# Patient Record
Sex: MALE | Race: WHITE | NOT HISPANIC OR LATINO
[De-identification: names, ages, dates, MRNs, and addresses within clinical notes are randomized per-mention and may not be internally consistent; named-entity substitution may affect disease eponyms.]

---

## 2017-05-19 ENCOUNTER — TRANSCRIPTION ENCOUNTER (OUTPATIENT)
Age: 72
End: 2017-05-19

## 2017-05-21 ENCOUNTER — TRANSCRIPTION ENCOUNTER (OUTPATIENT)
Age: 72
End: 2017-05-21

## 2017-05-26 ENCOUNTER — TRANSCRIPTION ENCOUNTER (OUTPATIENT)
Age: 72
End: 2017-05-26

## 2017-10-28 ENCOUNTER — TRANSCRIPTION ENCOUNTER (OUTPATIENT)
Age: 72
End: 2017-10-28

## 2017-11-08 ENCOUNTER — TRANSCRIPTION ENCOUNTER (OUTPATIENT)
Age: 72
End: 2017-11-08

## 2018-02-03 ENCOUNTER — TRANSCRIPTION ENCOUNTER (OUTPATIENT)
Age: 73
End: 2018-02-03

## 2018-02-06 ENCOUNTER — TRANSCRIPTION ENCOUNTER (OUTPATIENT)
Age: 73
End: 2018-02-06

## 2018-04-04 ENCOUNTER — APPOINTMENT (OUTPATIENT)
Dept: CARDIOLOGY | Facility: CLINIC | Age: 73
End: 2018-04-04
Payer: MEDICARE

## 2018-04-04 ENCOUNTER — NON-APPOINTMENT (OUTPATIENT)
Age: 73
End: 2018-04-04

## 2018-04-04 VITALS
SYSTOLIC BLOOD PRESSURE: 176 MMHG | HEIGHT: 71 IN | DIASTOLIC BLOOD PRESSURE: 97 MMHG | WEIGHT: 304 LBS | HEART RATE: 94 BPM | BODY MASS INDEX: 42.56 KG/M2 | OXYGEN SATURATION: 94 %

## 2018-04-04 DIAGNOSIS — Z87.891 PERSONAL HISTORY OF NICOTINE DEPENDENCE: ICD-10-CM

## 2018-04-04 DIAGNOSIS — R09.89 OTHER SPECIFIED SYMPTOMS AND SIGNS INVOLVING THE CIRCULATORY AND RESPIRATORY SYSTEMS: ICD-10-CM

## 2018-04-04 DIAGNOSIS — E03.9 HYPOTHYROIDISM, UNSPECIFIED: ICD-10-CM

## 2018-04-04 PROCEDURE — 93000 ELECTROCARDIOGRAM COMPLETE: CPT

## 2018-04-04 PROCEDURE — 99205 OFFICE O/P NEW HI 60 MIN: CPT

## 2018-06-21 ENCOUNTER — APPOINTMENT (OUTPATIENT)
Dept: CARDIOLOGY | Facility: CLINIC | Age: 73
End: 2018-06-21
Payer: MEDICARE

## 2018-06-21 PROCEDURE — 93880 EXTRACRANIAL BILAT STUDY: CPT

## 2018-07-25 ENCOUNTER — APPOINTMENT (OUTPATIENT)
Dept: CARDIOLOGY | Facility: CLINIC | Age: 73
End: 2018-07-25
Payer: MEDICARE

## 2018-07-25 PROCEDURE — 78452 HT MUSCLE IMAGE SPECT MULT: CPT

## 2018-07-25 PROCEDURE — 93015 CV STRESS TEST SUPVJ I&R: CPT

## 2018-07-25 PROCEDURE — A9500: CPT

## 2018-07-26 ENCOUNTER — APPOINTMENT (OUTPATIENT)
Dept: CARDIOLOGY | Facility: CLINIC | Age: 73
End: 2018-07-26
Payer: MEDICARE

## 2018-07-26 DIAGNOSIS — R94.39 ABNORMAL RESULT OF OTHER CARDIOVASCULAR FUNCTION STUDY: ICD-10-CM

## 2018-07-26 PROCEDURE — 93306 TTE W/DOPPLER COMPLETE: CPT

## 2018-08-08 PROBLEM — R94.39 ABNORMAL STRESS TEST: Status: ACTIVE | Noted: 2018-08-08

## 2018-08-27 ENCOUNTER — APPOINTMENT (OUTPATIENT)
Dept: CARDIOLOGY | Facility: CLINIC | Age: 73
End: 2018-08-27
Payer: MEDICARE

## 2018-08-27 VITALS
SYSTOLIC BLOOD PRESSURE: 159 MMHG | HEART RATE: 88 BPM | OXYGEN SATURATION: 85 % | DIASTOLIC BLOOD PRESSURE: 80 MMHG | BODY MASS INDEX: 44.1 KG/M2 | HEIGHT: 71 IN | WEIGHT: 315 LBS

## 2018-08-27 PROCEDURE — 99215 OFFICE O/P EST HI 40 MIN: CPT

## 2018-10-04 ENCOUNTER — APPOINTMENT (OUTPATIENT)
Dept: CARDIOLOGY | Facility: CLINIC | Age: 73
End: 2018-10-04
Payer: MEDICARE

## 2018-10-04 VITALS
HEART RATE: 74 BPM | WEIGHT: 315 LBS | HEIGHT: 71 IN | OXYGEN SATURATION: 88 % | SYSTOLIC BLOOD PRESSURE: 138 MMHG | DIASTOLIC BLOOD PRESSURE: 70 MMHG | BODY MASS INDEX: 44.1 KG/M2

## 2018-10-04 PROCEDURE — 99214 OFFICE O/P EST MOD 30 MIN: CPT

## 2018-11-07 ENCOUNTER — APPOINTMENT (OUTPATIENT)
Dept: CARDIOLOGY | Facility: CLINIC | Age: 73
End: 2018-11-07
Payer: MEDICARE

## 2018-11-07 VITALS
SYSTOLIC BLOOD PRESSURE: 101 MMHG | OXYGEN SATURATION: 90 % | WEIGHT: 300 LBS | DIASTOLIC BLOOD PRESSURE: 63 MMHG | BODY MASS INDEX: 42 KG/M2 | HEIGHT: 71 IN | HEART RATE: 75 BPM

## 2018-11-07 DIAGNOSIS — I87.2 VENOUS INSUFFICIENCY (CHRONIC) (PERIPHERAL): ICD-10-CM

## 2018-11-07 PROCEDURE — 99214 OFFICE O/P EST MOD 30 MIN: CPT

## 2018-11-07 NOTE — REASON FOR VISIT
[Follow-Up - Clinic] : a clinic follow-up of [Dyspnea] : dyspnea [Hyperlipidemia] : hyperlipidemia [Hypertension] : hypertension [FreeTextEntry1] : VPCs, Diabetes, CAD, Pul Htn

## 2018-11-07 NOTE — HISTORY OF PRESENT ILLNESS
[FreeTextEntry1] : I saw Kennedy Rick in the office today for cardiac follow up. He is a 72-year-old white male who has insulin-dependent diabetes, hypertension, hyperlipidemia. He was smoking 2 packs of cigarettes per day but stopped about 3 years ago. He is overweight. He has COPD. On routine ECG he was noted to have ventricular trigeminy. He has no symptoms.\par \par His parents  in a plane crash at an early age. Otherwise there is no family history of heart disease. He has not had any cardiac workup. He is limited in exercise by dyspnea on exertion. He does see a pulmonologist and endocrinologist.\par \par He has chronic venous insufficiency with support stockings. Has seen a vascular surgeon and has no significant arterial disease.\par \par As part of a cardiac workup for shortness of breath he underwent echocardiogram that showed ejection fraction 54%. Chemical nuclear stress she showed infra-apical defect suggesting possible myocardial infarction. He underwent cardiac catheterization . This showed nonobstructive coronary disease with a left ventricular end-diastolic pressure 18. Had significant pulmonary hypertension with a pressure 65/29. He also has obstructive sleep apnea.\par \par The patient has been diagnosed with sleep apnea and will be starting with a CPAP mask. \par \par The patient was recently admitted to [facility] for 5 days to treat his volume overload. He lost about 17 pounds of water. He is feeling better. The patient will get me a copy of his present medication list. His blood pressures 100/70 without orthostatic change.

## 2018-11-07 NOTE — REVIEW OF SYSTEMS
[Eyeglasses] : currently wearing eyeglasses [Dyspnea on exertion] : dyspnea during exertion [Lower Ext Edema] : lower extremity edema [Joint Pain] : joint pain [Shoulder Pain] : shoulder pain [Knee Pain] : knee pain [Negative] : Genitourinary [Fever] : no fever [Headache] : no headache [Chills] : no chills [Feeling Fatigued] : not feeling fatigued [Blurry Vision] : no blurred vision [Seeing Double (Diplopia)] : no diplopia [Skin: A Rash] : no rash: [Dizziness] : no dizziness [Depression] : no depression [Anxiety] : no anxiety [Excessive Thirst] : no polydipsia [Easy Bleeding] : no tendency for easy bleeding [Easy Bruising] : no tendency for easy bruising

## 2018-11-07 NOTE — DISCUSSION/SUMMARY
[FreeTextEntry1] : The patient is doing well. He will monitor his weight every day and if he starts to gain weight will let us know.\par \par I would like to review the records from [facility]. He is interested in starting in the exercise program at the [facility]. He will check to see if they have a pulmonary program.\par \par He will stay on his present medication. If all is well I would see him again in approximately 2 months.

## 2018-11-07 NOTE — PHYSICAL EXAM
[General Appearance - Well Developed] : well developed [Normal Appearance] : normal appearance [Well Groomed] : well groomed [General Appearance - Well Nourished] : well nourished [No Deformities] : no deformities [General Appearance - In No Acute Distress] : no acute distress [Normal Conjunctiva] : the conjunctiva exhibited no abnormalities [Normal Oral Mucosa] : normal oral mucosa [Normal Jugular Venous A Waves Present] : normal jugular venous A waves present [Normal Jugular Venous V Waves Present] : normal jugular venous V waves present [No Jugular Venous Arndt A Waves] : no jugular venous arndt A waves [Respiration, Rhythm And Depth] : normal respiratory rhythm and effort [Exaggerated Use Of Accessory Muscles For Inspiration] : no accessory muscle use [Auscultation Breath Sounds / Voice Sounds] : lungs were clear to auscultation bilaterally [Bowel Sounds] : normal bowel sounds [Abdomen Soft] : soft [Abdomen Tenderness] : non-tender [Abnormal Walk] : normal gait [Gait - Sufficient For Exercise Testing] : the gait was sufficient for exercise testing [Nail Clubbing] : no clubbing of the fingernails [Cyanosis, Localized] : no localized cyanosis [Skin Color & Pigmentation] : normal skin color and pigmentation [Skin Turgor] : normal skin turgor [] : no rash [Oriented To Time, Place, And Person] : oriented to person, place, and time [Impaired Insight] : insight and judgment were intact [No Anxiety] : not feeling anxious [Not Palpable] : not palpable [Normal Rate] : normal [Normal S1] : normal S1 [Normal S2] : normal S2 [Distant] : the heart sounds were distant [No Murmur] : no murmurs heard [2+] : left 2+ [1+] : left 1+ [No Abnormalities] : the abdominal aorta was not enlarged and no bruit was heard [___ +] : bilateral [unfilled]U+ pitting edema to the ankles [S3] : no S3 [S4] : no S4 [Right Carotid Bruit] : no bruit heard over the right carotid [Left Carotid Bruit] : no bruit heard over the left carotid [Right Femoral Bruit] : no bruit heard over the right femoral artery [Left Femoral Bruit] : no bruit heard over the left femoral artery

## 2019-01-14 ENCOUNTER — APPOINTMENT (OUTPATIENT)
Dept: CARDIOLOGY | Facility: CLINIC | Age: 74
End: 2019-01-14
Payer: MEDICARE

## 2019-01-14 VITALS
DIASTOLIC BLOOD PRESSURE: 60 MMHG | OXYGEN SATURATION: 93 % | HEART RATE: 60 BPM | BODY MASS INDEX: 41.86 KG/M2 | SYSTOLIC BLOOD PRESSURE: 104 MMHG | WEIGHT: 299 LBS | HEIGHT: 71 IN

## 2019-01-14 PROCEDURE — 99214 OFFICE O/P EST MOD 30 MIN: CPT

## 2019-01-14 NOTE — HISTORY OF PRESENT ILLNESS
[FreeTextEntry1] : I saw Kennedy Rick in the office today for cardiac follow up. He is a 73-year-old white male who has insulin-dependent diabetes, hypertension, hyperlipidemia. He was smoking 2 packs of cigarettes per day but stopped about 3 years ago. He is overweight. He has COPD. On routine ECG he was noted to have ventricular trigeminy. He has no symptoms.\par \par His parents  in a plane crash at an early age. Otherwise there is no family history of heart disease. He has not had any cardiac workup. He is limited in exercise by dyspnea on exertion. He does see a pulmonologist and endocrinologist.\par \par He has chronic venous insufficiency with support stockings. Has seen a vascular surgeon and has no significant arterial disease.\par \par As part of a cardiac workup for shortness of breath he underwent echocardiogram that showed ejection fraction 54%. Chemical nuclear stress she showed infra-apical defect suggesting possible myocardial infarction. He underwent cardiac catheterization . This showed nonobstructive coronary disease with a left ventricular end-diastolic pressure 18. Had significant pulmonary hypertension with a pressure 65/29. He also has obstructive sleep apnea.\par \par The patient has been diagnosed with sleep apnea and will be starting with a CPAP mask. \par \par The patient was recently admitted to [facility]  for 5 days to treat his volume overload. He lost about 17 pounds of water. He is feeling better.. His blood pressures 100/70 without orthostatic change.

## 2019-01-14 NOTE — DISCUSSION/SUMMARY
[FreeTextEntry1] : The patient is doing well.  Over the holidays he did not gaain any body weight and with his medications as kept off water weight. He is now going to concentrate on losing weight. He is able to walk and do some exercise and will be starting in the pulmonary exercise program at [place].\par \par He'll stay on his present medications. Today he is in a bigeminal pattern. He'll wear a 24-hour Holter monitor to quantitate the ventricular arrhythmia.\par \par If all is well I will see him in 3 months. If you do any blood work I would appreciates  a copy to my office. If he does have trouble he will call me.

## 2019-01-16 ENCOUNTER — APPOINTMENT (OUTPATIENT)
Dept: CARDIOLOGY | Facility: CLINIC | Age: 74
End: 2019-01-16
Payer: MEDICARE

## 2019-01-16 PROCEDURE — 93224 XTRNL ECG REC UP TO 48 HRS: CPT

## 2019-02-13 ENCOUNTER — FORM ENCOUNTER (OUTPATIENT)
Age: 74
End: 2019-02-13

## 2019-02-14 ENCOUNTER — APPOINTMENT (OUTPATIENT)
Dept: MRI IMAGING | Facility: CLINIC | Age: 74
End: 2019-02-14
Payer: MEDICARE

## 2019-02-14 ENCOUNTER — OUTPATIENT (OUTPATIENT)
Dept: OUTPATIENT SERVICES | Facility: HOSPITAL | Age: 74
LOS: 1 days | End: 2019-02-14
Payer: MEDICARE

## 2019-02-14 DIAGNOSIS — I49.3 VENTRICULAR PREMATURE DEPOLARIZATION: ICD-10-CM

## 2019-02-14 DIAGNOSIS — I25.10 ATHEROSCLEROTIC HEART DISEASE OF NATIVE CORONARY ARTERY WITHOUT ANGINA PECTORIS: ICD-10-CM

## 2019-02-14 DIAGNOSIS — I27.20 PULMONARY HYPERTENSION, UNSPECIFIED: ICD-10-CM

## 2019-02-14 PROCEDURE — 75561 CARDIAC MRI FOR MORPH W/DYE: CPT

## 2019-02-14 PROCEDURE — A9585: CPT

## 2019-02-14 PROCEDURE — 75561 CARDIAC MRI FOR MORPH W/DYE: CPT | Mod: 26

## 2019-03-28 ENCOUNTER — APPOINTMENT (OUTPATIENT)
Dept: CARDIOLOGY | Facility: CLINIC | Age: 74
End: 2019-03-28
Payer: MEDICARE

## 2019-03-28 VITALS
DIASTOLIC BLOOD PRESSURE: 81 MMHG | OXYGEN SATURATION: 90 % | SYSTOLIC BLOOD PRESSURE: 158 MMHG | HEART RATE: 84 BPM | HEIGHT: 71 IN | WEIGHT: 303 LBS | BODY MASS INDEX: 42.42 KG/M2

## 2019-03-28 PROCEDURE — 99215 OFFICE O/P EST HI 40 MIN: CPT

## 2019-03-28 NOTE — DISCUSSION/SUMMARY
[FreeTextEntry1] : The patient's cardiac status is stable. He has nonobstructive coronary disease with satisfactory left ventricular systolic function. There is evidence of possible subendocardial infarction both by stress testing and by MR scan. He has very frequent VPCs on a Holter monitor, numbering 7.5% of all heartbeats. He has no chest pain, palpitation or lightheadedness. His breathing is stable in the exercise program at [place], and his volume status is also stable on the Lasix and support stockings. He is not being treated for sleep apnea at this time but according to the patient he feel that this is a mild condition.\par \par He will stay on his present medication and try to lose weight. If he has any symptoms or problems he will call me. I would appreciate your sending me a copy of his most recent blood for my review and he will get a fasting lipid profile to see if we need to adjust the Crestor. If all is well, I will see him in 3 months.\par \par He did go over the importance of lifestyle including exercise, low calorie low fat low salt diet, and weight control. If he does have any symptoms he would call me. I answered all of his questions.\par \par

## 2019-03-28 NOTE — HISTORY OF PRESENT ILLNESS
[FreeTextEntry1] : I saw Kennedy Rick in the office today for cardiac follow up. He is a 73-year-old white male who has insulin-dependent diabetes, hypertension, hyperlipidemia. He was smoking 2 packs of cigarettes per day but stopped about 3+  years ago. He is overweight. He has COPD. On routine ECG he was noted to have ventricular trigeminy. He has no symptoms.\par \par His parents  in a plane crash at an early age. Otherwise there is no family history of heart disease. He has not had any cardiac workup. He is limited in exercise by dyspnea on exertion. He does see a pulmonologist and endocrinologist.\par \par He has chronic venous insufficiency with support stockings. Has seen a vascular surgeon and has no significant arterial disease.\par \par As part of a cardiac workup for shortness of breath he underwent echocardiogram that showed ejection fraction 54%. Chemical nuclear stress she showed infra-apical defect suggesting possible myocardial infarction. He underwent cardiac catheterization . This showed nonobstructive coronary disease with a left ventricular end-diastolic pressure 18. Had significant pulmonary hypertension with a pressure 65/29. He also has obstructive sleep apnea.He had been using a CPAP mask but apparently the company felt that he wasn't using it enough and it was taken away. \par \par The patient was  admitted to [facility]  for 5 days to treat his volume overload. He lost about 17 pounds of water. He is feeling better.. He now goes 2 times a week to the pulmonary rehabilitation program at [facility] has been doing well. He has been maintaining his weight loss but has not been able to lose additional weight. Status remained stable and his breathing has been good. O2 sat remains approximately 90%.\par \par His having no palpitations, chest pain, or lightheadedness.\par \par He had an MR scan of his heart that showed ejection fraction of 58%. There was a subtle foci of gadolinium enhancement along the inferolateral wall and anteroseptal wall that could be secondary to myocardial infarction. His prior chemical stress test showed possible infarction in the infra-apical area. Ejection fraction was 46%. I had discussed his case with the doctor physiologist Freida La he felt that carvedilol was adequate for treatment..

## 2019-06-25 ENCOUNTER — APPOINTMENT (OUTPATIENT)
Dept: CARDIOLOGY | Facility: CLINIC | Age: 74
End: 2019-06-25
Payer: MEDICARE

## 2019-06-25 VITALS
WEIGHT: 300 LBS | SYSTOLIC BLOOD PRESSURE: 147 MMHG | HEART RATE: 74 BPM | DIASTOLIC BLOOD PRESSURE: 76 MMHG | OXYGEN SATURATION: 88 % | HEIGHT: 71 IN | BODY MASS INDEX: 42 KG/M2

## 2019-06-25 PROCEDURE — 99214 OFFICE O/P EST MOD 30 MIN: CPT

## 2019-06-25 NOTE — REASON FOR VISIT
[Follow-Up - Clinic] : a clinic follow-up of [Hypertension] : hypertension [Hyperlipidemia] : hyperlipidemia [Dyspnea] : dyspnea [FreeTextEntry1] : VPCs, Diabetes, CAD, Pul Htn

## 2019-06-25 NOTE — REVIEW OF SYSTEMS
[Eyeglasses] : currently wearing eyeglasses [Dyspnea on exertion] : dyspnea during exertion [Joint Pain] : joint pain [Lower Ext Edema] : lower extremity edema [Shoulder Pain] : shoulder pain [Knee Pain] : knee pain [Negative] : Genitourinary [Headache] : no headache [Fever] : no fever [Feeling Fatigued] : not feeling fatigued [Blurry Vision] : no blurred vision [Chills] : no chills [Dizziness] : no dizziness [Skin: A Rash] : no rash: [Seeing Double (Diplopia)] : no diplopia [Depression] : no depression [Anxiety] : no anxiety [Excessive Thirst] : no polydipsia [Easy Bruising] : no tendency for easy bruising [Easy Bleeding] : no tendency for easy bleeding

## 2019-06-25 NOTE — HISTORY OF PRESENT ILLNESS
[FreeTextEntry1] : I saw Kennedy Rick in the office today for cardiac follow up. He is a 73-year-old white male who has insulin-dependent diabetes, hypertension, hyperlipidemia. He was smoking 2 packs of cigarettes per day but stopped about 3+  years ago. He is overweight. He has COPD. On routine ECG he was noted to have ventricular trigeminy. He has no symptoms.\par \par His parents  in a plane crash at an early age. Otherwise there is no family history of heart disease. He has not had any cardiac workup. He is limited in exercise by dyspnea on exertion. He does see a pulmonologist and endocrinologist.\par \par He has chronic venous insufficiency with support stockings. Has seen a vascular surgeon and has no significant arterial disease.\par \par As part of a cardiac workup for shortness of breath he underwent echocardiogram that showed ejection fraction 54%. Chemical nuclear stress she showed infra-apical defect suggesting possible myocardial infarction. He underwent cardiac catheterization . This showed nonobstructive coronary disease with a left ventricular end-diastolic pressure 18. Had significant pulmonary hypertension with a pressure 65/29. He also has obstructive sleep apnea.He had been using a CPAP mask but apparently the company felt that he wasn't using it enough and it was taken away. \par \par The patient was  admitted to [facility]  for 5 days to treat his volume overload. He lost about 17 pounds of water. He is feeling better.. He now goes 2 times a week to the pulmonary rehabilitation program at [facility] has been doing well. He has been maintaining his weight loss but has not been able to lose additional weight. Status remained stable and his breathing has been good. O2 sat remains approximately 90%.He is still not being treated for sleep apnea\par \par His having no palpitations, chest pain, or lightheadedness.\par \par He had an MR scan of his heart that showed ejection fraction of 58%. There was a subtle foci of gadolinium enhancement along the inferolateral wall and anteroseptal wall that could be secondary to myocardial infarction. His prior chemical stress test showed possible infarction in the infra-apical area. Ejection fraction was 46%. I had discussed his case with Dr. Kahn, the electrophysiologist who  felt that carvedilol was adequate for treatment..

## 2019-06-25 NOTE — DISCUSSION/SUMMARY
[FreeTextEntry1] : The patient's cardiac status remained stable. On his medications clinically having no significant arrhythmia. He is not in any left-sided congestive heart failure. He remains with stable pulmonary hypertension with chronic this insufficiency. On his medication his blood pressure heart rate are well controlled.\par \par  I would appreciate receiving a copy of his most recent general blood work. He will stay on his present medication. Because of any symptoms or problems he will call me. He still trying to lose total body weight and has been unsuccessful.\par \par If all is well I will see him in 4 months.

## 2019-06-25 NOTE — PHYSICAL EXAM
[General Appearance - Well Developed] : well developed [Normal Appearance] : normal appearance [Well Groomed] : well groomed [No Deformities] : no deformities [General Appearance - Well Nourished] : well nourished [General Appearance - In No Acute Distress] : no acute distress [Normal Conjunctiva] : the conjunctiva exhibited no abnormalities [Normal Oral Mucosa] : normal oral mucosa [Normal Jugular Venous A Waves Present] : normal jugular venous A waves present [Normal Jugular Venous V Waves Present] : normal jugular venous V waves present [No Jugular Venous Arndt A Waves] : no jugular venous arndt A waves [Exaggerated Use Of Accessory Muscles For Inspiration] : no accessory muscle use [Respiration, Rhythm And Depth] : normal respiratory rhythm and effort [Bowel Sounds] : normal bowel sounds [Auscultation Breath Sounds / Voice Sounds] : lungs were clear to auscultation bilaterally [Abnormal Walk] : normal gait [Abdomen Tenderness] : non-tender [Abdomen Soft] : soft [Gait - Sufficient For Exercise Testing] : the gait was sufficient for exercise testing [Cyanosis, Localized] : no localized cyanosis [Nail Clubbing] : no clubbing of the fingernails [Skin Color & Pigmentation] : normal skin color and pigmentation [Skin Turgor] : normal skin turgor [] : no rash [Oriented To Time, Place, And Person] : oriented to person, place, and time [No Anxiety] : not feeling anxious [Impaired Insight] : insight and judgment were intact [Normal S1] : normal S1 [Normal Rate] : normal [Not Palpable] : not palpable [Normal S2] : normal S2 [Distant] : the heart sounds were distant [No Murmur] : no murmurs heard [2+] : left 2+ [1+] : right 1+ [No Abnormalities] : the abdominal aorta was not enlarged and no bruit was heard [___ +] : bilateral [unfilled]U+ pitting edema to the ankles [S4] : no S4 [Right Carotid Bruit] : no bruit heard over the right carotid [S3] : no S3 [Left Carotid Bruit] : no bruit heard over the left carotid [Right Femoral Bruit] : no bruit heard over the right femoral artery [Left Femoral Bruit] : no bruit heard over the left femoral artery

## 2019-10-20 ENCOUNTER — TRANSCRIPTION ENCOUNTER (OUTPATIENT)
Age: 74
End: 2019-10-20

## 2019-10-29 ENCOUNTER — APPOINTMENT (OUTPATIENT)
Dept: CARDIOLOGY | Facility: CLINIC | Age: 74
End: 2019-10-29
Payer: MEDICARE

## 2019-10-29 ENCOUNTER — NON-APPOINTMENT (OUTPATIENT)
Age: 74
End: 2019-10-29

## 2019-10-29 VITALS
OXYGEN SATURATION: 87 % | BODY MASS INDEX: 42.84 KG/M2 | SYSTOLIC BLOOD PRESSURE: 111 MMHG | HEART RATE: 68 BPM | HEIGHT: 71 IN | WEIGHT: 306 LBS | DIASTOLIC BLOOD PRESSURE: 70 MMHG

## 2019-10-29 DIAGNOSIS — I49.3 VENTRICULAR PREMATURE DEPOLARIZATION: ICD-10-CM

## 2019-10-29 PROCEDURE — 99214 OFFICE O/P EST MOD 30 MIN: CPT

## 2019-10-29 PROCEDURE — 93000 ELECTROCARDIOGRAM COMPLETE: CPT

## 2019-10-29 NOTE — DISCUSSION/SUMMARY
[FreeTextEntry1] : The patient is stable. He is having no shortness of breath, chest pain, palpitations. Unfortunately failed to lose any weight. According to the patient his kidney function has been stable. Volume status is unchanged.\par \par The patient started his present medication. If you do any blood work are appreciated a copy for my records.\par \par If he has any problems or symptoms he would call me. He'll go for follow up echocardiogram. If all is well I will see him in 6 months.

## 2019-10-29 NOTE — HISTORY OF PRESENT ILLNESS
[FreeTextEntry1] : I saw Kennedy Rikc in the office today for cardiac follow up. He is a 74-year-old white male who has insulin-dependent diabetes, hypertension, hyperlipidemia. He was smoking 2 packs of cigarettes per day but stopped about 3+  years ago. He is overweight. He has COPD. On routine ECG he was noted to have ventricular trigeminy. He had no symptoms.\par \par His parents  in a plane crash at an early age. Otherwise there is no family history of heart disease. He has not had any cardiac workup. He is limited in exercise by dyspnea on exertion. He does see a pulmonologist and endocrinologist.\par \par He has chronic venous insufficiency with support stockings. Has seen a vascular surgeon and has no significant arterial disease.\par \par As part of a cardiac workup for shortness of breath he underwent echocardiogram that showed ejection fraction 54%. Chemical nuclear stress she showed infra-apical defect suggesting possible myocardial infarction. He underwent cardiac catheterization . This showed nonobstructive coronary disease with a left ventricular end-diastolic pressure 18. Had significant pulmonary hypertension with a pressure 65/29. He also has obstructive sleep apnea.He had been using a CPAP mask but apparently the company felt that he wasn't using it enough and it was taken away. \par \par The patient was  admitted to [facility]  for 5 days to treat his volume overload. He lost about 17 pounds of water. He is feeling better.. He now goes 2 times a week to the pulmonary rehabilitation program at [facility] has been doing well. He has been maintaining his weight loss but has not been able to lose additional weight. Status remained stable and his breathing has been good. O2 sat remains approximately 90%.He is still not being treated for sleep apnea\par \par His having no palpitations, chest pain, or lightheadedness.\par \par He had an MR scan of his heart that showed ejection fraction of 58%. There was a subtle foci of gadolinium enhancement along the inferolateral wall and anteroseptal wall that could be secondary to myocardial infarction. His prior chemical stress test showed possible infarction in the infra-apical area. Ejection fraction was 46%. I had discussed his case with Dr. Kahn, the electrophysiologist who  felt that carvedilol was adequate for treatment..\par \par His recent Holter monitor performed  demonstrated [id number] VPCs with 39 couplets and 99 couplets. Echo 10/18 demonstrated normal left ventricular systolic function.

## 2019-11-23 ENCOUNTER — EMERGENCY (EMERGENCY)
Facility: HOSPITAL | Age: 74
LOS: 1 days | Discharge: HOME | End: 2019-11-23
Attending: EMERGENCY MEDICINE | Admitting: EMERGENCY MEDICINE
Payer: MEDICARE

## 2019-11-23 VITALS
OXYGEN SATURATION: 98 % | TEMPERATURE: 98 F | DIASTOLIC BLOOD PRESSURE: 72 MMHG | HEART RATE: 78 BPM | RESPIRATION RATE: 16 BRPM | SYSTOLIC BLOOD PRESSURE: 115 MMHG

## 2019-11-23 VITALS
OXYGEN SATURATION: 91 % | HEART RATE: 71 BPM | RESPIRATION RATE: 15 BRPM | WEIGHT: 291.89 LBS | SYSTOLIC BLOOD PRESSURE: 120 MMHG | DIASTOLIC BLOOD PRESSURE: 75 MMHG | HEIGHT: 71 IN | TEMPERATURE: 98 F

## 2019-11-23 PROCEDURE — 73562 X-RAY EXAM OF KNEE 3: CPT

## 2019-11-23 PROCEDURE — 99284 EMERGENCY DEPT VISIT MOD MDM: CPT | Mod: 25

## 2019-11-23 PROCEDURE — 73610 X-RAY EXAM OF ANKLE: CPT | Mod: 26,LT

## 2019-11-23 PROCEDURE — 73610 X-RAY EXAM OF ANKLE: CPT

## 2019-11-23 PROCEDURE — 73562 X-RAY EXAM OF KNEE 3: CPT | Mod: 26,LT

## 2019-11-23 PROCEDURE — 99283 EMERGENCY DEPT VISIT LOW MDM: CPT

## 2019-11-23 PROCEDURE — 73590 X-RAY EXAM OF LOWER LEG: CPT

## 2019-11-23 PROCEDURE — 73590 X-RAY EXAM OF LOWER LEG: CPT | Mod: 26,LT

## 2019-11-23 NOTE — ED ADULT TRIAGE NOTE - CHIEF COMPLAINT QUOTE
patient is brought in by EMS from home. fell this afternoon from outside, patient stated it was mechanical fall to avoid bus, fell on his left side and unable to put weight on his left knee. hx of DM, HTN , COPD

## 2019-11-23 NOTE — ED PROVIDER NOTE - OBJECTIVE STATEMENT
74 year old male with history of COPD, HTN, DM, and HLD BIBA for left lower leg pain after fall today at 3:00 pm. tripped and fell at [employer] game and fell onto left lower leg. patient states he believes it was more of a twisting mechanism. denies hitting head or LOC. does not take any blood thinners. was able to get up and walk to car on own. went to store and then back home. tried to walk up the stairs later and had hard time putting pressure on left leg. no pain at rest, increased to 10/10 when trying to walk. took tylenol over the counter without much improvement of pain  PCP Arnoldo Marie

## 2019-11-23 NOTE — ED PROVIDER NOTE - MUSCULOSKELETAL, MLM
mild tenderness to left lower leg just distal to left knee. no deformity. full ROM of left hip, knee, and ankle. mild tenderness diffusely to left ankle. achilles tendon intact. no tenderness over head of fibular or base of 5th metatarsal

## 2019-11-23 NOTE — ED PROVIDER NOTE - CARE PROVIDER_API CALL
Fredis Castañeda)  Orthopaedic Surgery; Sports Medicine  [address] [place] [address]  Phone: [phone]  Fax: [phone]  Follow Up Time:

## 2019-11-23 NOTE — ED PROVIDER NOTE - NSFOLLOWUPINSTRUCTIONS_ED_ALL_ED_FT
rest, ice, elevate, support. ace wrap and knee immobilizer. cane for ambulation. tylenol for pain. have close follow up with orthopedics if pain continues. referral provided

## 2019-11-23 NOTE — ED PROVIDER NOTE - ATTENDING CONTRIBUTION TO CARE
74 y.o. M tripped on curb hitting just below left LE about 3pm this afternoon, doesn't hurt when at rest, but does hurt to move and mostly hurts to bear weight, no paresthesias, was ambulatory after the fall, no other injury; on exam pt wd, obese, NAD; left LE - no deformity, +ttp proximal tibia, FROM, normal distal sensation, +skin changes c/w chronic PVD, +distal anterior knee; A/P - xr to r/o fx, knee immobilizer, possible cane vs walker at home, do not think pt would do well with crutches, outpt ortho if not improving

## 2019-11-23 NOTE — ED PROVIDER NOTE - PROGRESS NOTE DETAILS
Reevaluated patient at bedside.  Patient feeling improved. ace wrap and immobilizer applied. cane provided. referral to ortho provided.  Discussed the results of all diagnostic testing in ED. no obvious fx. discussed possibility of small missed occult fx and will need close ortho follow up. will continue tylenol for pain.   An opportunity to ask questions was given.  Discussed the importance of prompt, close medical follow-up.  Patient will return with any changes, concerns or persistent / worsening symptoms.  Understanding of all instructions verbalized. Reevaluated patient at bedside.  Patient feeling improved. ace wrap and immobilizer applied. ambulated well with use of walker. prescription for walker provided. referral to ortho provided.  Discussed the results of all diagnostic testing in ED. no obvious fx. discussed possibility of small missed occult fx and will need close ortho follow up. will continue tylenol for pain.   An opportunity to ask questions was given.  Discussed the importance of prompt, close medical follow-up.  Patient will return with any changes, concerns or persistent / worsening symptoms.  Understanding of all instructions verbalized.

## 2019-11-23 NOTE — ED PROVIDER NOTE - CARDIAC, MLM
Patient returned call, he is only taking 200 mg tabs. Patient requests lab only to recheck CBC, per last visit note (results). Scheduled for tomorrow AM.    HAILEE TitusN RN  [facility]     Normal rate, regular rhythm

## 2019-11-23 NOTE — ED PROVIDER NOTE - CLINICAL SUMMARY MEDICAL DECISION MAKING FREE TEXT BOX
left lower leg pain and ankle pain after mechanical fall today. will x-ray to eval for fx. have also considered contusion vs sprain. denies hitting head or LOC. no not suspect ICH or skull fx. no vert tenderness to suggest vert fx

## 2019-11-23 NOTE — ED PROVIDER NOTE - CARE PLAN
Principal Discharge DX:	Contusion of leg, left, initial encounter  Secondary Diagnosis:	Sprain of left ankle, unspecified ligament, initial encounter

## 2019-11-27 DIAGNOSIS — M25.572 PAIN IN LEFT ANKLE AND JOINTS OF LEFT FOOT: ICD-10-CM

## 2020-01-04 ENCOUNTER — TRANSCRIPTION ENCOUNTER (OUTPATIENT)
Age: 75
End: 2020-01-04

## 2020-01-30 ENCOUNTER — APPOINTMENT (OUTPATIENT)
Dept: CARDIOLOGY | Facility: CLINIC | Age: 75
End: 2020-01-30
Payer: MEDICARE

## 2020-01-30 PROCEDURE — 93306 TTE W/DOPPLER COMPLETE: CPT

## 2020-10-08 ENCOUNTER — TRANSCRIPTION ENCOUNTER (OUTPATIENT)
Age: 75
End: 2020-10-08

## 2020-12-21 PROBLEM — G47.33 OSA ON CPAP: Status: ACTIVE | Noted: 2018-10-04

## 2020-12-22 ENCOUNTER — NON-APPOINTMENT (OUTPATIENT)
Age: 75
End: 2020-12-22

## 2020-12-22 ENCOUNTER — APPOINTMENT (OUTPATIENT)
Dept: CARDIOLOGY | Facility: CLINIC | Age: 75
End: 2020-12-22
Payer: MEDICARE

## 2020-12-22 VITALS
WEIGHT: 290 LBS | SYSTOLIC BLOOD PRESSURE: 149 MMHG | OXYGEN SATURATION: 96 % | BODY MASS INDEX: 40.6 KG/M2 | HEIGHT: 71 IN | DIASTOLIC BLOOD PRESSURE: 91 MMHG | HEART RATE: 77 BPM

## 2020-12-22 DIAGNOSIS — Z99.89 OBSTRUCTIVE SLEEP APNEA (ADULT) (PEDIATRIC): ICD-10-CM

## 2020-12-22 DIAGNOSIS — G47.33 OBSTRUCTIVE SLEEP APNEA (ADULT) (PEDIATRIC): ICD-10-CM

## 2020-12-22 PROCEDURE — 99214 OFFICE O/P EST MOD 30 MIN: CPT

## 2020-12-22 PROCEDURE — 93000 ELECTROCARDIOGRAM COMPLETE: CPT

## 2020-12-22 NOTE — DISCUSSION/SUMMARY
[FreeTextEntry1] : The patient has new-onset atrial fibrillation. It is unclear when this started since he is actually no symptoms. He remains hemodynamically stable. Blood pressure is well controlled. Lost about 15 pounds.\par \par We'll repeat the echocardiogram first. Pending with this demonstrates we may send him to an electrophysiologist for consideration of electrical cardioversion. I did discuss with him the rate versus rhythm control.  Being 75 years old it would be best if we get him back to normal rhythm.\par \par Once I get the echo I will call the patient and we will probably recommend him being seen by an electrophysiologist.

## 2020-12-22 NOTE — HISTORY OF PRESENT ILLNESS
[FreeTextEntry1] : I saw Kennedy Rick in the office today for cardiac follow up. He is a 75-year-old white male who has insulin-dependent diabetes, hypertension, hyperlipidemia. He was smoking 2 packs of cigarettes per day but stopped about 3+  years ago. He is overweight. He has COPD. On routine ECG he was noted to have ventricular trigeminy. He had no symptoms.\par \par His parents  in a plane crash at an early age. Otherwise there is no family history of heart disease. He has not had any cardiac workup. He is limited in exercise by dyspnea on exertion. He does see a pulmonologist and endocrinologist.\par \par He has chronic venous insufficiency with support stockings. Has seen a vascular surgeon and has no significant arterial disease.\par \par As part of a cardiac workup for shortness of breath he underwent echocardiogram that showed ejection fraction 54%. Chemical nuclear stress she showed infra-apical defect suggesting possible myocardial infarction. He underwent cardiac catheterization . This showed nonobstructive coronary disease with a left ventricular end-diastolic pressure 18. Had significant pulmonary hypertension with a pressure 65/29. He also has obstructive sleep apnea.He had been using a CPAP mask but apparently the company felt that he wasn't using it enough and it was taken away. \par \par The patient was  admitted to [facility]  for 5 days to treat his volume overload. He lost about 17 pounds of water. He is feeling better.. He now goes 2 times a week to the pulmonary rehabilitation program at [facility] has been doing well. He has been maintaining his weight loss but has not been able to lose additional weight. Status remained stable and his breathing has been good. O2 sat remains approximately 90%.He is still not being treated for sleep apnea\par \par His having no palpitations, chest pain, or lightheadedness.\par \par He had an MR scan of his heart that showed ejection fraction of 58%. There was a subtle foci of gadolinium enhancement along the inferolateral wall and anteroseptal wall that could be secondary to myocardial infarction. His prior chemical stress test showed possible infarction in the infra-apical area. Ejection fraction was 46%. I had discussed his case with Dr. Kahn, the electrophysiologist who  felt that carvedilol was adequate for treatment..\par \par Holter monitor performed  demonstrated 7844 VPCs with 39 couplets and 99 couplets. Echo  demonstrated an EF 54%, with min MR, and TR, stage I DD..\par \par ECG shows atrial fibrillation with a controlled ventricular rate. There were no other acute changes. Patient denies any symptoms of chest pain, increasing shortness of breath, palpitations, or fatigue. He is on Xarelto.

## 2021-01-03 LAB
CHOLEST SERPL-MCNC: 115 MG/DL
HDLC SERPL-MCNC: 52 MG/DL
LDLC SERPL CALC-MCNC: 50 MG/DL
NONHDLC SERPL-MCNC: 62 MG/DL
TRIGL SERPL-MCNC: 60 MG/DL

## 2021-01-08 ENCOUNTER — TRANSCRIPTION ENCOUNTER (OUTPATIENT)
Age: 76
End: 2021-01-08

## 2021-01-14 ENCOUNTER — APPOINTMENT (OUTPATIENT)
Dept: CARDIOLOGY | Facility: CLINIC | Age: 76
End: 2021-01-14
Payer: MEDICARE

## 2021-01-14 PROCEDURE — 93306 TTE W/DOPPLER COMPLETE: CPT

## 2021-01-15 ENCOUNTER — MED ADMIN CHARGE (OUTPATIENT)
Age: 76
End: 2021-01-15

## 2021-01-15 RX ORDER — PERFLUTREN 6.52 MG/ML
6.52 INJECTION, SUSPENSION INTRAVENOUS
Qty: 1 | Refills: 0 | Status: COMPLETED | OUTPATIENT
Start: 2021-01-15

## 2021-01-15 RX ADMIN — PERFLUTREN MG/ML: 6.52 INJECTION, SUSPENSION INTRAVENOUS at 00:00

## 2021-01-19 ENCOUNTER — NON-APPOINTMENT (OUTPATIENT)
Age: 76
End: 2021-01-19

## 2021-02-02 ENCOUNTER — APPOINTMENT (OUTPATIENT)
Dept: ELECTROPHYSIOLOGY | Facility: CLINIC | Age: 76
End: 2021-02-02

## 2021-02-15 PROBLEM — R06.00 DOE (DYSPNEA ON EXERTION): Status: ACTIVE | Noted: 2018-04-04

## 2021-02-15 PROBLEM — I31.3 PERICARDIAL EFFUSION: Status: ACTIVE | Noted: 2021-01-22

## 2021-02-16 ENCOUNTER — APPOINTMENT (OUTPATIENT)
Dept: CARDIOLOGY | Facility: CLINIC | Age: 76
End: 2021-02-16
Payer: MEDICARE

## 2021-02-16 VITALS
SYSTOLIC BLOOD PRESSURE: 136 MMHG | WEIGHT: 272 LBS | DIASTOLIC BLOOD PRESSURE: 84 MMHG | OXYGEN SATURATION: 89 % | HEIGHT: 71 IN | BODY MASS INDEX: 38.08 KG/M2

## 2021-02-16 DIAGNOSIS — R06.00 DYSPNEA, UNSPECIFIED: ICD-10-CM

## 2021-02-16 DIAGNOSIS — I31.3 PERICARDIAL EFFUSION (NONINFLAMMATORY): ICD-10-CM

## 2021-02-16 PROCEDURE — 99215 OFFICE O/P EST HI 40 MIN: CPT

## 2021-02-16 RX ORDER — ROSUVASTATIN CALCIUM 10 MG/1
10 TABLET, FILM COATED ORAL
Qty: 30 | Refills: 3 | Status: ACTIVE | COMMUNITY

## 2021-02-16 NOTE — REVIEW OF SYSTEMS
[Eyeglasses] : currently wearing eyeglasses [Dyspnea on exertion] : dyspnea during exertion [Lower Ext Edema] : lower extremity edema [Joint Pain] : joint pain [Shoulder Pain] : shoulder pain [Knee Pain] : knee pain [Negative] : Genitourinary [Fever] : no fever [Headache] : no headache [Chills] : no chills [Feeling Fatigued] : not feeling fatigued [Blurry Vision] : no blurred vision [Skin: A Rash] : no rash: [Seeing Double (Diplopia)] : no diplopia [Dizziness] : no dizziness [Depression] : no depression [Anxiety] : no anxiety [Excessive Thirst] : no polydipsia [Easy Bleeding] : no tendency for easy bleeding [Easy Bruising] : no tendency for easy bruising

## 2021-02-16 NOTE — DISCUSSION/SUMMARY
[FreeTextEntry1] : I spoke with Dr Roy. Recommended starting an antiarrhythmic before second attempt at cardioversion. To start Multaq 400mg BID. With lung disease decided against amiodarone. To have cardioversion in 3-5 days. Spoke with Dr Villatoro who felt if necessary could try amiodarone.\par \par Scheduled for [facility], to better manage CHF. Have reduced metoprolol to 25mg QD with starting Multaq.\par \par Appreciate copy of blood work from yesterday.  Patient will monitor weight and call by end of week for instructions on Bumex. See patient in 3 weeks. Went over hospital records and answered questions.\par \par Also needs to get CPAP for IRMA.

## 2021-02-16 NOTE — HISTORY OF PRESENT ILLNESS
[FreeTextEntry1] : I saw Kennedy Rick in the office today for cardiac follow up. He is a 75-year-old white male who has insulin-dependent diabetes, hypertension, hyperlipidemia. He was smoking 2 packs of cigarettes per day but stopped about 3+  years ago. He is overweight. He has COPD. On routine ECG he was noted to have ventricular trigeminy. He had no symptoms.\par \par His parents  in a plane crash at an early age. Otherwise there is no family history of heart disease. He has not had any cardiac workup. He is limited in exercise by dyspnea on exertion. He does see a pulmonologist and endocrinologist.\par \par He has chronic venous insufficiency with support stockings. Has seen a vascular surgeon and has no significant arterial disease.\par \par As part of a cardiac workup for shortness of breath he underwent echocardiogram  that showed ejection fraction 54%. Chemical nuclear stress she showed infra-apical defect suggesting possible myocardial infarction. He underwent cardiac catheterization . This showed nonobstructive coronary disease with a left ventricular end-diastolic pressure 18. Had significant pulmonary hypertension with a pressure 65/29. He also has obstructive sleep apnea.He had been using a CPAP mask but apparently the company felt that he wasn't using it enough and it was taken away.  Repeat echo  demonstrated an EF 61%. There was mild-moderate pericardial effusion.\par \par The patient was  admitted to [facility]  for 5 days to treat his volume overload. He lost about 17 pounds of water. He is feeling better.. He now goes 2 times a week to the pulmonary rehabilitation program at [facility] has been doing well. He has been maintaining his weight loss but has not been able to lose additional weight. Status remained stable and his breathing has been good. O2 sat remains approximately 90%.He is still not being treated for sleep apnea\par \par His having no palpitations, chest pain, or lightheadedness.\par \par He had an MR scan of his heart that showed ejection fraction of 58%. There was a subtle foci of gadolinium enhancement along the inferolateral wall and anteroseptal wall that could be secondary to myocardial infarction. His prior chemical stress test showed possible infarction in the infra-apical area. Ejection fraction was 46%. I had discussed his case with Dr. Kahn, the electrophysiologist who  felt that carvedilol was adequate for treatment..\par \par Holter monitor performed  demonstrated 7844 VPCs with 39 couplets and 99 couplets. Echo  demonstrated an EF 54%, with min MR, and TR, stage I DD..\par \par ECG shows atrial fibrillation with a controlled ventricular rate. There were no other acute changes. Patient denies any symptoms of chest pain, increasing shortness of breath, palpitations, or fatigue. He is on Xarelto.\par \par The patient was admitted to [facility] for 10 days to trat volume overload. Treated with IV lasix and cardioverted to RSR.  lost about 25 pounds of fluid.  At home weight gain and ECG yesterday showed A fib with rapid ventricular rate. Metoprolol increased and bumex increased to 3mg BID. Still SOB today.

## 2021-02-16 NOTE — PHYSICAL EXAM
[General Appearance - Well Developed] : well developed [Normal Appearance] : normal appearance [Well Groomed] : well groomed [General Appearance - Well Nourished] : well nourished [No Deformities] : no deformities [General Appearance - In No Acute Distress] : no acute distress [Normal Conjunctiva] : the conjunctiva exhibited no abnormalities [Normal Oral Mucosa] : normal oral mucosa [Normal Jugular Venous A Waves Present] : normal jugular venous A waves present [Normal Jugular Venous V Waves Present] : normal jugular venous V waves present [No Jugular Venous Arndt A Waves] : no jugular venous arndt A waves [Respiration, Rhythm And Depth] : normal respiratory rhythm and effort [Exaggerated Use Of Accessory Muscles For Inspiration] : no accessory muscle use [Auscultation Breath Sounds / Voice Sounds] : lungs were clear to auscultation bilaterally [Bowel Sounds] : normal bowel sounds [Abdomen Soft] : soft [Abdomen Tenderness] : non-tender [Gait - Sufficient For Exercise Testing] : the gait was sufficient for exercise testing [Abnormal Walk] : normal gait [Nail Clubbing] : no clubbing of the fingernails [Cyanosis, Localized] : no localized cyanosis [Skin Color & Pigmentation] : normal skin color and pigmentation [Skin Turgor] : normal skin turgor [] : no rash [Oriented To Time, Place, And Person] : oriented to person, place, and time [Impaired Insight] : insight and judgment were intact [No Anxiety] : not feeling anxious [Not Palpable] : not palpable [Normal Rate] : normal [Normal S1] : normal S1 [Normal S2] : normal S2 [Distant] : the heart sounds were distant [No Murmur] : no murmurs heard [2+] : left 2+ [1+] : left 1+ [No Abnormalities] : the abdominal aorta was not enlarged and no bruit was heard [___ +] : bilateral [unfilled]U+ pitting edema to the ankles [S3] : no S3 [S4] : no S4 [Right Carotid Bruit] : no bruit heard over the right carotid [Left Carotid Bruit] : no bruit heard over the left carotid [Right Femoral Bruit] : no bruit heard over the right femoral artery [Left Femoral Bruit] : no bruit heard over the left femoral artery

## 2021-04-22 PROBLEM — I27.20 PULMONARY HYPERTENSION: Status: ACTIVE | Noted: 2018-08-27

## 2021-04-22 PROBLEM — I25.10 ARTERIOSCLEROTIC CARDIOVASCULAR DISEASE (ASCVD): Status: ACTIVE | Noted: 2018-08-27

## 2021-04-22 PROBLEM — E78.5 HYPERLIPIDEMIA: Status: ACTIVE | Noted: 2018-04-04

## 2021-04-22 PROBLEM — E11.9 DIABETES MELLITUS: Status: ACTIVE | Noted: 2018-04-04

## 2021-04-22 PROBLEM — I10 HYPERTENSION: Status: ACTIVE | Noted: 2018-04-04

## 2021-04-26 ENCOUNTER — APPOINTMENT (OUTPATIENT)
Dept: CARDIOLOGY | Facility: CLINIC | Age: 76
End: 2021-04-26
Payer: MEDICARE

## 2021-04-26 VITALS
HEART RATE: 91 BPM | BODY MASS INDEX: 35 KG/M2 | DIASTOLIC BLOOD PRESSURE: 90 MMHG | SYSTOLIC BLOOD PRESSURE: 150 MMHG | OXYGEN SATURATION: 93 % | HEIGHT: 71 IN | WEIGHT: 250 LBS

## 2021-04-26 DIAGNOSIS — I27.20 PULMONARY HYPERTENSION, UNSPECIFIED: ICD-10-CM

## 2021-04-26 DIAGNOSIS — I25.10 ATHEROSCLEROTIC HEART DISEASE OF NATIVE CORONARY ARTERY W/OUT ANGINA PECTORIS: ICD-10-CM

## 2021-04-26 DIAGNOSIS — E11.9 TYPE 2 DIABETES MELLITUS W/OUT COMPLICATIONS: ICD-10-CM

## 2021-04-26 DIAGNOSIS — I10 ESSENTIAL (PRIMARY) HYPERTENSION: ICD-10-CM

## 2021-04-26 DIAGNOSIS — E78.5 HYPERLIPIDEMIA, UNSPECIFIED: ICD-10-CM

## 2021-04-26 PROCEDURE — 99214 OFFICE O/P EST MOD 30 MIN: CPT

## 2021-04-26 NOTE — DISCUSSION/SUMMARY
[FreeTextEntry1] : The patient's cardiac status is stable.  He is status post A. fib ablation and maintaining sinus rhythm.  Volume status is stable, lungs are clear, and he continues to watch his diet and lose weight.  Blood pressure is mildly elevated today but at other doctor's offices it has been in the normal range.  He will start checking it at home.  He is using his CPAP mask for obstructive sleep apnea.\par \par He will stay on his present medication.  If he has any problems he would call me.  He is being monitored at [place] by [facility].  If all is well I will see him in 2 months.  We did go over his medications and I answered all of his questions.

## 2021-04-26 NOTE — PHYSICAL EXAM
[General Appearance - Well Developed] : well developed [Normal Appearance] : normal appearance [Well Groomed] : well groomed [General Appearance - Well Nourished] : well nourished [No Deformities] : no deformities [General Appearance - In No Acute Distress] : no acute distress [Normal Conjunctiva] : the conjunctiva exhibited no abnormalities [Normal Oral Mucosa] : normal oral mucosa [Normal Jugular Venous A Waves Present] : normal jugular venous A waves present [Normal Jugular Venous V Waves Present] : normal jugular venous V waves present [No Jugular Venous Arndt A Waves] : no jugular venous anrdt A waves [Respiration, Rhythm And Depth] : normal respiratory rhythm and effort [Exaggerated Use Of Accessory Muscles For Inspiration] : no accessory muscle use [Auscultation Breath Sounds / Voice Sounds] : lungs were clear to auscultation bilaterally [Bowel Sounds] : normal bowel sounds [Abdomen Soft] : soft [Abdomen Tenderness] : non-tender [Abnormal Walk] : normal gait [Gait - Sufficient For Exercise Testing] : the gait was sufficient for exercise testing [Nail Clubbing] : no clubbing of the fingernails [Skin Color & Pigmentation] : normal skin color and pigmentation [Cyanosis, Localized] : no localized cyanosis [Skin Turgor] : normal skin turgor [] : no rash [Oriented To Time, Place, And Person] : oriented to person, place, and time [No Anxiety] : not feeling anxious [Impaired Insight] : insight and judgment were intact [Not Palpable] : not palpable [Normal Rate] : normal [Normal S1] : normal S1 [Normal S2] : normal S2 [Distant] : the heart sounds were distant [No Murmur] : no murmurs heard [2+] : left 2+ [1+] : left 1+ [No Abnormalities] : the abdominal aorta was not enlarged and no bruit was heard [___ +] : bilateral [unfilled]U+ pitting edema to the ankles [S3] : no S3 [S4] : no S4 [Right Carotid Bruit] : no bruit heard over the right carotid [Left Carotid Bruit] : no bruit heard over the left carotid [Left Femoral Bruit] : no bruit heard over the left femoral artery [Right Femoral Bruit] : no bruit heard over the right femoral artery

## 2021-04-26 NOTE — HISTORY OF PRESENT ILLNESS
[FreeTextEntry1] : I saw Kennedy Rick in the office today for cardiac follow up. He is a 75-year-old white male who has insulin-dependent diabetes, hypertension, hyperlipidemia. He was smoking 2 packs of cigarettes per day but stopped about 3+  years ago. He is overweight. He has COPD. On routine ECG he was noted to have ventricular trigeminy. He had no symptoms.\par \par His parents  in a plane crash at an early age. Otherwise there is no family history of heart disease. He has not had any cardiac workup. He is limited in exercise by dyspnea on exertion. He does see a pulmonologist and endocrinologist.\par \par He has chronic venous insufficiency with support stockings. Has seen a vascular surgeon and has no significant arterial disease.\par \par As part of a cardiac workup for shortness of breath he underwent echocardiogram  that showed ejection fraction 54%. Chemical nuclear stress she showed infra-apical defect suggesting possible myocardial infarction. He underwent cardiac catheterization . This showed nonobstructive coronary disease with a left ventricular end-diastolic pressure 18. Had significant pulmonary hypertension with a pressure 65/29. He also has obstructive sleep apnea.He had been using a CPAP mask but apparently the company felt that he wasn't using it enough and it was taken away.  Repeat echo  demonstrated an EF 61%. There was mild-moderate pericardial effusion.\par \par The patient was  admitted to [facility]  for 5 days to treat his volume overload. He lost about 17 pounds of water. He is felt better.. He was going to the pulmonary rehabilitation program at [facility] and doing well.\par \par He had an MR scan of his heart that showed ejection fraction of 58%. There was a subtle foci of gadolinium enhancement along the inferolateral wall and anteroseptal wall that could be secondary to myocardial infarction. His prior chemical stress test showed possible infarction in the infra-apical area. Ejection fraction was 46%. I had discussed his case with Dr. Kahn, the electrophysiologist who  felt that carvedilol was adequate for treatment..\par \par Holter monitor performed  demonstrated 7844 VPCs with 39 couplets and 99 couplets. Echo  demonstrated an EF 54%, with min MR, and TR, stage I DD..\par \par The patient was admitted to [facility] for 10 days to treat volume overload. Treated with IV lasix and cardioverted to RSR.  lost about 25 pounds of fluid.  At home weight gain and ECG  showed recurrent A fib with rapid ventricular rate. Metoprolol increased and bumex increased to 3mg BID. \par \par Started on Multaq and referred back to Dr. Roy at [place].  Underwent A. fib ablation and has been maintaining sinus rhythm.  In addition, he has a [facility] implanted which helps monitor his diuretic.  Is maintained on Multaq but his metoprolol has been stopped.  He is now on Bumex 4 mg in the morning and 2 mg in the evening.  He is feeling well and continues to lose weight.  He has no symptoms of shortness of breath.

## 2021-06-01 ENCOUNTER — APPOINTMENT (OUTPATIENT)
Dept: CARDIOLOGY | Facility: CLINIC | Age: 76
End: 2021-06-01
Payer: MEDICARE

## 2021-06-01 ENCOUNTER — MED ADMIN CHARGE (OUTPATIENT)
Age: 76
End: 2021-06-01

## 2021-06-01 PROCEDURE — 93306 TTE W/DOPPLER COMPLETE: CPT

## 2021-06-01 RX ORDER — PERFLUTREN 6.52 MG/ML
6.52 INJECTION, SUSPENSION INTRAVENOUS
Qty: 1 | Refills: 0 | Status: COMPLETED | OUTPATIENT
Start: 2021-06-01

## 2022-12-15 ENCOUNTER — NON-APPOINTMENT (OUTPATIENT)
Age: 77
End: 2022-12-15

## 2023-01-01 NOTE — ED PROVIDER NOTE - PATIENT PORTAL LINK FT
none of the above
You can access the FollowMyHealth Patient Portal offered by [facility] by registering at the following website: [email or web address]. By joining [email or web address]’s FollowMyHealth portal, you will also be able to view your health information using other applications (apps) compatible with our system.

## 2023-07-24 ENCOUNTER — EMERGENCY (EMERGENCY)
Facility: HOSPITAL | Age: 78
LOS: 1 days | End: 2023-07-24
Admitting: EMERGENCY MEDICINE
Payer: MEDICARE

## 2023-07-25 ENCOUNTER — TRANSCRIPTION ENCOUNTER (OUTPATIENT)
Age: 78
End: 2023-07-25

## 2023-07-25 PROCEDURE — 84484 ASSAY OF TROPONIN QUANT: CPT

## 2023-07-25 PROCEDURE — 96376 TX/PRO/DX INJ SAME DRUG ADON: CPT

## 2023-07-25 PROCEDURE — 96375 TX/PRO/DX INJ NEW DRUG ADDON: CPT

## 2023-07-25 PROCEDURE — 82962 GLUCOSE BLOOD TEST: CPT

## 2023-07-25 PROCEDURE — 70450 CT HEAD/BRAIN W/O DYE: CPT | Mod: MA

## 2023-07-25 PROCEDURE — 0225U NFCT DS DNA&RNA 21 SARSCOV2: CPT

## 2023-07-25 PROCEDURE — 80048 BASIC METABOLIC PNL TOTAL CA: CPT

## 2023-07-25 PROCEDURE — 96367 TX/PROPH/DG ADDL SEQ IV INF: CPT

## 2023-07-25 PROCEDURE — 86803 HEPATITIS C AB TEST: CPT

## 2023-07-25 PROCEDURE — 93306 TTE W/DOPPLER COMPLETE: CPT

## 2023-07-25 PROCEDURE — 80053 COMPREHEN METABOLIC PANEL: CPT

## 2023-07-25 PROCEDURE — 36415 COLL VENOUS BLD VENIPUNCTURE: CPT

## 2023-07-25 PROCEDURE — 83735 ASSAY OF MAGNESIUM: CPT

## 2023-07-25 PROCEDURE — 93005 ELECTROCARDIOGRAM TRACING: CPT

## 2023-07-25 PROCEDURE — 96361 HYDRATE IV INFUSION ADD-ON: CPT

## 2023-07-25 PROCEDURE — 87449 NOS EACH ORGANISM AG IA: CPT

## 2023-07-25 PROCEDURE — 85610 PROTHROMBIN TIME: CPT

## 2023-07-25 PROCEDURE — 83036 HEMOGLOBIN GLYCOSYLATED A1C: CPT

## 2023-07-25 PROCEDURE — 96368 THER/DIAG CONCURRENT INF: CPT

## 2023-07-25 PROCEDURE — 80061 LIPID PANEL: CPT

## 2023-07-25 PROCEDURE — 85730 THROMBOPLASTIN TIME PARTIAL: CPT

## 2023-07-25 PROCEDURE — 85025 COMPLETE CBC W/AUTO DIFF WBC: CPT

## 2023-07-25 PROCEDURE — 96366 THER/PROPH/DIAG IV INF ADDON: CPT

## 2023-07-25 PROCEDURE — 99285 EMERGENCY DEPT VISIT HI MDM: CPT | Mod: 25

## 2023-07-25 PROCEDURE — 71045 X-RAY EXAM CHEST 1 VIEW: CPT

## 2023-07-25 PROCEDURE — 84443 ASSAY THYROID STIM HORMONE: CPT

## 2023-07-25 PROCEDURE — 96365 THER/PROPH/DIAG IV INF INIT: CPT

## 2023-07-27 ENCOUNTER — APPOINTMENT (OUTPATIENT)
Dept: CARE COORDINATION | Facility: HOME HEALTH | Age: 78
End: 2023-07-27
Payer: MEDICARE

## 2023-07-27 VITALS
DIASTOLIC BLOOD PRESSURE: 76 MMHG | WEIGHT: 243 LBS | SYSTOLIC BLOOD PRESSURE: 154 MMHG | RESPIRATION RATE: 16 BRPM | BODY MASS INDEX: 33.89 KG/M2 | HEART RATE: 60 BPM | OXYGEN SATURATION: 97 %

## 2023-07-27 DIAGNOSIS — J18.9 PNEUMONIA, UNSPECIFIED ORGANISM: ICD-10-CM

## 2023-07-27 DIAGNOSIS — I50.9 HEART FAILURE, UNSPECIFIED: ICD-10-CM

## 2023-07-27 DIAGNOSIS — I48.0 PAROXYSMAL ATRIAL FIBRILLATION: ICD-10-CM

## 2023-07-27 DIAGNOSIS — J44.9 CHRONIC OBSTRUCTIVE PULMONARY DISEASE, UNSPECIFIED: ICD-10-CM

## 2023-07-27 PROCEDURE — 99495 TRANSJ CARE MGMT MOD F2F 14D: CPT

## 2023-07-27 RX ORDER — POTASSIUM CHLORIDE 1500 MG/1
20 TABLET, EXTENDED RELEASE ORAL TWICE DAILY
Refills: 0 | Status: DISCONTINUED | COMMUNITY
Start: 2017-11-28 | End: 2023-07-27

## 2023-07-27 RX ORDER — FAMOTIDINE 20 MG/1
20 TABLET, FILM COATED ORAL
Refills: 0 | Status: ACTIVE | COMMUNITY

## 2023-07-27 RX ORDER — METOPROLOL SUCCINATE 25 MG/1
25 TABLET, EXTENDED RELEASE ORAL
Qty: 90 | Refills: 3 | Status: ACTIVE | COMMUNITY
Start: 2023-07-27

## 2023-07-27 RX ORDER — DRONEDARONE 400 MG/1
400 TABLET, FILM COATED ORAL TWICE DAILY
Qty: 60 | Refills: 5 | Status: DISCONTINUED | COMMUNITY
End: 2023-07-27

## 2023-07-27 RX ORDER — SEMAGLUTIDE 1.34 MG/ML
2 INJECTION, SOLUTION SUBCUTANEOUS
Qty: 9 | Refills: 0 | Status: DISCONTINUED | COMMUNITY
Start: 2020-12-10 | End: 2023-07-27

## 2023-07-27 RX ORDER — INSULIN DEGLUDEC INJECTION 200 U/ML
200 INJECTION, SOLUTION SUBCUTANEOUS
Qty: 18 | Refills: 0 | Status: DISCONTINUED | COMMUNITY
Start: 2018-01-08 | End: 2023-07-27

## 2023-07-27 RX ORDER — COLCHICINE 0.6 MG/1
0.6 TABLET ORAL
Qty: 30 | Refills: 0 | Status: DISCONTINUED | COMMUNITY
Start: 2021-02-15 | End: 2023-07-27

## 2023-07-27 RX ORDER — EMPAGLIFLOZIN 25 MG/1
25 TABLET, FILM COATED ORAL DAILY
Refills: 0 | Status: ACTIVE | COMMUNITY
Start: 2020-12-18

## 2023-07-27 RX ORDER — FLUTICASONE FUROATE, UMECLIDINIUM BROMIDE AND VILANTEROL TRIFENATATE 100; 62.5; 25 UG/1; UG/1; UG/1
100-62.5-25 POWDER RESPIRATORY (INHALATION)
Refills: 0 | Status: ACTIVE | COMMUNITY

## 2023-07-27 RX ORDER — CEFUROXIME AXETIL 500 MG/1
500 TABLET ORAL
Qty: 10 | Refills: 0 | Status: ACTIVE | COMMUNITY
Start: 2023-07-27

## 2023-07-27 RX ORDER — SPIRONOLACTONE 50 MG/1
50 TABLET ORAL
Qty: 30 | Refills: 0 | Status: DISCONTINUED | COMMUNITY
Start: 2021-02-05 | End: 2023-07-27

## 2023-07-27 RX ORDER — APIXABAN 5 MG/1
5 TABLET, FILM COATED ORAL
Refills: 0 | Status: ACTIVE | COMMUNITY
Start: 2021-02-05

## 2023-07-27 RX ORDER — LEVOTHYROXINE SODIUM 0.17 MG/1
175 TABLET ORAL DAILY
Refills: 0 | Status: ACTIVE | COMMUNITY
Start: 2017-12-21

## 2023-07-27 RX ORDER — INSULIN ASPART 100 [IU]/ML
100 INJECTION, SOLUTION INTRAVENOUS; SUBCUTANEOUS
Qty: 30 | Refills: 0 | Status: DISCONTINUED | COMMUNITY
Start: 2017-11-28 | End: 2023-07-27

## 2023-07-27 RX ORDER — BUMETANIDE 2 MG/1
2 TABLET ORAL DAILY
Refills: 0 | Status: ACTIVE | COMMUNITY
Start: 2021-02-05

## 2023-07-27 NOTE — HISTORY OF PRESENT ILLNESS
[Post-hospitalization from ___ Hospital] : Post-hospitalization from [unfilled] Hospital [Admitted on: ___] : The patient was admitted on [unfilled] [Discharged on ___] : discharged on [unfilled] [Discharge Summary] : discharge summary [Discharge Med List] : discharge medication list [Med Reconciliation] : medication reconciliation has been completed [Patient Contacted By: ____] : and contacted by [unfilled] [FreeTextEntry2] : FROM [facility] DC NOTE PROVIDER:\par 76 y/o M with PMH of HTN, DM2, Dyslipidemia, A. Fib s/p RFA on Eliquis, CHF, Pericardial Effusion, COPD, IRMA on C-PAP, Hypothyroidism, Chronic Venous Insufficiency, and Obesity with 2 syncopal episodes.\par \par  Problem/Plan - 1:\par ·  Problem: Syncope. \par ·  [place] and another episode at the ED witnessed by ED staff after receiving a total of 2000 ml NS bolus, no witnessed seizure activity, possible vasovagal r/o dysrhythmia, negative troponin X2, admitted to telemetry\par - orthostatic positive, s/p IVF bolus\par \par Problem: Neutrophilic leukocytosis. \par ·  Plan: chest X ray with questionable PNA, patient empirically received Rocephin & Azithromycin IVBP X1 dose \par - patient persistently refusing chest CT because of his chronic back pain even after offering pain \par - ID recs appreciated, continue ceftriaxone for now\par - RVP positive for entero/rhinovirus\par - d/c 5 days po ceftin\par \par CC:\par Pt is seen at home s/p recent admission for SYNCOPE/PNA. Pt is temporarily unable to leave home as it requires a considerable and taxing effort\par HPI:\par Pt Is a 76 y/o male enrolled in the [facility] seen at home s/p a recent admission for Syncope/PNA. Pt was contacted by TCM RN on 7/26  and med rec was done within 48 hours of DC.\par \par Upon examination A&O x 3, lives with wife, NAD. Denies CP, SOB, headache, dizziness, pain, abd discomfort or difficulty with bowel or bladder. CHF: Reports 9lb weight gain in 2 days. Bumex, metoprolol and finereone stopped by ED 2/2 orthostatic BP and 2 syncopal episodes. He has cardiomems, this NP called Card Dr Balderas and spoke with his NP Kaitlynn.. mems trending up, BP stable in home today, not orthostatic 154/76 sitting, 155/82 standing. Restart Bumex 6g BID x 3 days, then resume 6mg am and 4mg pm. He will see his PCP on Monday and will ask for BMP and to check EKG. Metoprolol to restart at 25mg daily, Fenerenone on hold for now. Pafib: s/p ablation, RRR on eliquis. PAFIB: RRR, eliquis s/ ablation. PNA: CXR + pneumonia, CBC with L shift, complete course po ceftin. COPD: no signs exacerbation. No home care services\par \par \par \par

## 2023-07-27 NOTE — PLAN
[FreeTextEntry1] : A/P:\par s/p hospitalization for syncopal episodes possibly 2/2 dehydration/orthostatic hypotension.\par \par # CHF:\par - bumex on hold 2/2 orthostatics\par - patient with 9lb weight gain 2 days, mems trending up\par - restart Bumex 6mg BID x 3 days then resume 6mg am and 4mg pm (per card Dr. Balderas)\par - restart metoprolol at 25mg daily (per card Dr. Balderas)\par - con't daily wts, low Na diet, call for worsening of sx\par - f/u PCP Monday for EKG  and BMP\par \par # PNA:\par - + RLL PNA, elevated WBC with L shift\par - s/p rocephin/azitho in ED, complete 5 day course po ceftin on d/c\par - f/u PCP\par \par # Pafib:\par - s/p ablation, RRR on exam\par - con't BB, eliquis\par \par # COPD:\par - no signs exacerbation\par - con't trelegy, call for worsening of sx

## 2023-07-27 NOTE — PHYSICAL EXAM
[No Acute Distress] : no acute distress [Well Nourished] : well nourished [Well Developed] : well developed [Well-Appearing] : well-appearing [Normal Sclera/Conjunctiva] : normal sclera/conjunctiva [Normal Outer Ear/Nose] : the outer ears and nose were normal in appearance [Normal Oropharynx] : the oropharynx was normal [Supple] : supple [No Respiratory Distress] : no respiratory distress  [Clear to Auscultation] : lungs were clear to auscultation bilaterally [No Accessory Muscle Use] : no accessory muscle use [Normal Rate] : normal rate  [Regular Rhythm] : with a regular rhythm [Normal S1, S2] : normal S1 and S2 [Pedal Pulses Present] : the pedal pulses are present [Soft] : abdomen soft [Non Tender] : non-tender [Non-distended] : non-distended [Normal Bowel Sounds] : normal bowel sounds [No Spinal Tenderness] : no spinal tenderness [Grossly Normal Strength/Tone] : grossly normal strength/tone [No Rash] : no rash [Normal Gait] : normal gait [Coordination Grossly Intact] : coordination grossly intact [No Focal Deficits] : no focal deficits [Normal Affect] : the affect was normal [Alert and Oriented x3] : oriented to person, place, and time [Normal Insight/Judgement] : insight and judgment were intact [de-identified] : no thrush [de-identified] : distant heart sounds [de-identified] : trace edema BLE, + venous stasis

## 2024-01-27 ENCOUNTER — INPATIENT (INPATIENT)
Facility: HOSPITAL | Age: 79
LOS: 3 days | Discharge: NURSING, REHAB OR LONG-TERM CARE | DRG: 481 | End: 2024-01-31
Attending: FAMILY MEDICINE | Admitting: INTERNAL MEDICINE
Payer: MEDICARE

## 2024-01-27 VITALS
TEMPERATURE: 98 F | RESPIRATION RATE: 16 BRPM | OXYGEN SATURATION: 96 % | SYSTOLIC BLOOD PRESSURE: 144 MMHG | WEIGHT: 233.91 LBS | HEIGHT: 71 IN | DIASTOLIC BLOOD PRESSURE: 79 MMHG | HEART RATE: 83 BPM

## 2024-01-27 DIAGNOSIS — K21.9 GASTRO-ESOPHAGEAL REFLUX DISEASE WITHOUT ESOPHAGITIS: ICD-10-CM

## 2024-01-27 DIAGNOSIS — J44.9 CHRONIC OBSTRUCTIVE PULMONARY DISEASE, UNSPECIFIED: ICD-10-CM

## 2024-01-27 DIAGNOSIS — Z98.890 OTHER SPECIFIED POSTPROCEDURAL STATES: Chronic | ICD-10-CM

## 2024-01-27 DIAGNOSIS — E78.5 HYPERLIPIDEMIA, UNSPECIFIED: ICD-10-CM

## 2024-01-27 DIAGNOSIS — I50.9 HEART FAILURE, UNSPECIFIED: ICD-10-CM

## 2024-01-27 DIAGNOSIS — I48.20 CHRONIC ATRIAL FIBRILLATION, UNSPECIFIED: ICD-10-CM

## 2024-01-27 DIAGNOSIS — S72.112A DISPLACED FRACTURE OF GREATER TROCHANTER OF LEFT FEMUR, INITIAL ENCOUNTER FOR CLOSED FRACTURE: ICD-10-CM

## 2024-01-27 DIAGNOSIS — S72.113A DISPLACED FRACTURE OF GREATER TROCHANTER OF UNSPECIFIED FEMUR, INITIAL ENCOUNTER FOR CLOSED FRACTURE: ICD-10-CM

## 2024-01-27 DIAGNOSIS — Z90.49 ACQUIRED ABSENCE OF OTHER SPECIFIED PARTS OF DIGESTIVE TRACT: Chronic | ICD-10-CM

## 2024-01-27 DIAGNOSIS — N17.9 ACUTE KIDNEY FAILURE, UNSPECIFIED: ICD-10-CM

## 2024-01-27 DIAGNOSIS — N18.30 CHRONIC KIDNEY DISEASE, STAGE 3 UNSPECIFIED: ICD-10-CM

## 2024-01-27 DIAGNOSIS — E11.9 TYPE 2 DIABETES MELLITUS WITHOUT COMPLICATIONS: ICD-10-CM

## 2024-01-27 DIAGNOSIS — Z90.89 ACQUIRED ABSENCE OF OTHER ORGANS: Chronic | ICD-10-CM

## 2024-01-27 DIAGNOSIS — I10 ESSENTIAL (PRIMARY) HYPERTENSION: ICD-10-CM

## 2024-01-27 DIAGNOSIS — N18.9 CHRONIC KIDNEY DISEASE, UNSPECIFIED: ICD-10-CM

## 2024-01-27 DIAGNOSIS — Z29.9 ENCOUNTER FOR PROPHYLACTIC MEASURES, UNSPECIFIED: ICD-10-CM

## 2024-01-27 DIAGNOSIS — E03.9 HYPOTHYROIDISM, UNSPECIFIED: ICD-10-CM

## 2024-01-27 LAB
ALBUMIN SERPL ELPH-MCNC: 3.2 G/DL — LOW (ref 3.3–5)
ALP SERPL-CCNC: 112 U/L — SIGNIFICANT CHANGE UP (ref 40–120)
ALT FLD-CCNC: 24 U/L — SIGNIFICANT CHANGE UP (ref 12–78)
ANION GAP SERPL CALC-SCNC: 5 MMOL/L — SIGNIFICANT CHANGE UP (ref 5–17)
APPEARANCE UR: CLEAR — SIGNIFICANT CHANGE UP
APTT BLD: 33 SEC — SIGNIFICANT CHANGE UP (ref 24.5–35.6)
AST SERPL-CCNC: 16 U/L — SIGNIFICANT CHANGE UP (ref 15–37)
BASOPHILS # BLD AUTO: 0.03 K/UL — SIGNIFICANT CHANGE UP (ref 0–0.2)
BASOPHILS NFR BLD AUTO: 0.3 % — SIGNIFICANT CHANGE UP (ref 0–2)
BILIRUB SERPL-MCNC: 0.5 MG/DL — SIGNIFICANT CHANGE UP (ref 0.2–1.2)
BILIRUB UR-MCNC: NEGATIVE — SIGNIFICANT CHANGE UP
BUN SERPL-MCNC: 33 MG/DL — HIGH (ref 7–23)
CALCIUM SERPL-MCNC: 9.3 MG/DL — SIGNIFICANT CHANGE UP (ref 8.5–10.1)
CHLORIDE SERPL-SCNC: 104 MMOL/L — SIGNIFICANT CHANGE UP (ref 96–108)
CO2 SERPL-SCNC: 28 MMOL/L — SIGNIFICANT CHANGE UP (ref 22–31)
COLOR SPEC: YELLOW — SIGNIFICANT CHANGE UP
CREAT SERPL-MCNC: 1.4 MG/DL — HIGH (ref 0.5–1.3)
DIFF PNL FLD: NEGATIVE — SIGNIFICANT CHANGE UP
EGFR: 52 ML/MIN/1.73M2 — LOW
EOSINOPHIL # BLD AUTO: 0.05 K/UL — SIGNIFICANT CHANGE UP (ref 0–0.5)
EOSINOPHIL NFR BLD AUTO: 0.5 % — SIGNIFICANT CHANGE UP (ref 0–6)
FLUAV AG NPH QL: SIGNIFICANT CHANGE UP
FLUBV AG NPH QL: SIGNIFICANT CHANGE UP
GLUCOSE SERPL-MCNC: 161 MG/DL — HIGH (ref 70–99)
GLUCOSE UR QL: >=1000 MG/DL
HCT VFR BLD CALC: 45.7 % — SIGNIFICANT CHANGE UP (ref 39–50)
HGB BLD-MCNC: 15.2 G/DL — SIGNIFICANT CHANGE UP (ref 13–17)
IMM GRANULOCYTES NFR BLD AUTO: 0.4 % — SIGNIFICANT CHANGE UP (ref 0–0.9)
INR BLD: 1.02 RATIO — SIGNIFICANT CHANGE UP (ref 0.85–1.18)
KETONES UR-MCNC: NEGATIVE MG/DL — SIGNIFICANT CHANGE UP
LEUKOCYTE ESTERASE UR-ACNC: NEGATIVE — SIGNIFICANT CHANGE UP
LYMPHOCYTES # BLD AUTO: 0.76 K/UL — LOW (ref 1–3.3)
LYMPHOCYTES # BLD AUTO: 7.9 % — LOW (ref 13–44)
MCHC RBC-ENTMCNC: 29 PG — SIGNIFICANT CHANGE UP (ref 27–34)
MCHC RBC-ENTMCNC: 33.3 GM/DL — SIGNIFICANT CHANGE UP (ref 32–36)
MCV RBC AUTO: 87.2 FL — SIGNIFICANT CHANGE UP (ref 80–100)
MONOCYTES # BLD AUTO: 0.75 K/UL — SIGNIFICANT CHANGE UP (ref 0–0.9)
MONOCYTES NFR BLD AUTO: 7.8 % — SIGNIFICANT CHANGE UP (ref 2–14)
NEUTROPHILS # BLD AUTO: 7.96 K/UL — HIGH (ref 1.8–7.4)
NEUTROPHILS NFR BLD AUTO: 83.1 % — HIGH (ref 43–77)
NITRITE UR-MCNC: NEGATIVE — SIGNIFICANT CHANGE UP
NRBC # BLD: 0 /100 WBCS — SIGNIFICANT CHANGE UP (ref 0–0)
PH UR: 5.5 — SIGNIFICANT CHANGE UP (ref 5–8)
PLATELET # BLD AUTO: 200 K/UL — SIGNIFICANT CHANGE UP (ref 150–400)
POTASSIUM SERPL-MCNC: 3.4 MMOL/L — LOW (ref 3.5–5.3)
POTASSIUM SERPL-SCNC: 3.4 MMOL/L — LOW (ref 3.5–5.3)
PROT SERPL-MCNC: 7.1 G/DL — SIGNIFICANT CHANGE UP (ref 6–8.3)
PROT UR-MCNC: SIGNIFICANT CHANGE UP MG/DL
PROTHROM AB SERPL-ACNC: 11.9 SEC — SIGNIFICANT CHANGE UP (ref 9.5–13)
RBC # BLD: 5.24 M/UL — SIGNIFICANT CHANGE UP (ref 4.2–5.8)
RBC # FLD: 15.3 % — HIGH (ref 10.3–14.5)
RSV RNA NPH QL NAA+NON-PROBE: SIGNIFICANT CHANGE UP
SARS-COV-2 RNA SPEC QL NAA+PROBE: SIGNIFICANT CHANGE UP
SODIUM SERPL-SCNC: 137 MMOL/L — SIGNIFICANT CHANGE UP (ref 135–145)
SP GR SPEC: 1.02 — SIGNIFICANT CHANGE UP (ref 1–1.03)
UROBILINOGEN FLD QL: 0.2 MG/DL — SIGNIFICANT CHANGE UP (ref 0.2–1)
WBC # BLD: 9.59 K/UL — SIGNIFICANT CHANGE UP (ref 3.8–10.5)
WBC # FLD AUTO: 9.59 K/UL — SIGNIFICANT CHANGE UP (ref 3.8–10.5)

## 2024-01-27 PROCEDURE — 99222 1ST HOSP IP/OBS MODERATE 55: CPT

## 2024-01-27 PROCEDURE — 93010 ELECTROCARDIOGRAM REPORT: CPT

## 2024-01-27 PROCEDURE — 72192 CT PELVIS W/O DYE: CPT | Mod: 26,MA

## 2024-01-27 PROCEDURE — 99285 EMERGENCY DEPT VISIT HI MDM: CPT | Mod: FS

## 2024-01-27 PROCEDURE — 71045 X-RAY EXAM CHEST 1 VIEW: CPT | Mod: 26

## 2024-01-27 PROCEDURE — 76376 3D RENDER W/INTRP POSTPROCES: CPT | Mod: 26

## 2024-01-27 RX ORDER — TRAMADOL HYDROCHLORIDE 50 MG/1
50 TABLET ORAL EVERY 6 HOURS
Refills: 0 | Status: DISCONTINUED | OUTPATIENT
Start: 2024-01-27 | End: 2024-01-30

## 2024-01-27 RX ORDER — BUMETANIDE 0.25 MG/ML
6 INJECTION INTRAMUSCULAR; INTRAVENOUS DAILY
Refills: 0 | Status: DISCONTINUED | OUTPATIENT
Start: 2024-01-28 | End: 2024-01-30

## 2024-01-27 RX ORDER — POTASSIUM CHLORIDE 20 MEQ
20 PACKET (EA) ORAL ONCE
Refills: 0 | Status: COMPLETED | OUTPATIENT
Start: 2024-01-27 | End: 2024-01-27

## 2024-01-27 RX ORDER — ACETAMINOPHEN 500 MG
650 TABLET ORAL EVERY 6 HOURS
Refills: 0 | Status: DISCONTINUED | OUTPATIENT
Start: 2024-01-27 | End: 2024-01-30

## 2024-01-27 RX ORDER — FAMOTIDINE 10 MG/ML
20 INJECTION INTRAVENOUS
Refills: 0 | Status: DISCONTINUED | OUTPATIENT
Start: 2024-01-27 | End: 2024-01-30

## 2024-01-27 RX ORDER — SODIUM CHLORIDE 9 MG/ML
1000 INJECTION INTRAMUSCULAR; INTRAVENOUS; SUBCUTANEOUS ONCE
Refills: 0 | Status: COMPLETED | OUTPATIENT
Start: 2024-01-27 | End: 2024-01-27

## 2024-01-27 RX ORDER — INSULIN LISPRO 100/ML
VIAL (ML) SUBCUTANEOUS
Refills: 0 | Status: DISCONTINUED | OUTPATIENT
Start: 2024-01-27 | End: 2024-01-30

## 2024-01-27 RX ORDER — ATORVASTATIN CALCIUM 80 MG/1
40 TABLET, FILM COATED ORAL AT BEDTIME
Refills: 0 | Status: DISCONTINUED | OUTPATIENT
Start: 2024-01-27 | End: 2024-01-30

## 2024-01-27 RX ORDER — TIRZEPATIDE 15 MG/.5ML
10 INJECTION, SOLUTION SUBCUTANEOUS
Refills: 0 | DISCHARGE

## 2024-01-27 RX ORDER — GLUCAGON INJECTION, SOLUTION 0.5 MG/.1ML
1 INJECTION, SOLUTION SUBCUTANEOUS ONCE
Refills: 0 | Status: DISCONTINUED | OUTPATIENT
Start: 2024-01-27 | End: 2024-01-30

## 2024-01-27 RX ORDER — DEXTROSE 50 % IN WATER 50 %
15 SYRINGE (ML) INTRAVENOUS ONCE
Refills: 0 | Status: DISCONTINUED | OUTPATIENT
Start: 2024-01-27 | End: 2024-01-30

## 2024-01-27 RX ORDER — DEXTROSE 50 % IN WATER 50 %
12.5 SYRINGE (ML) INTRAVENOUS ONCE
Refills: 0 | Status: DISCONTINUED | OUTPATIENT
Start: 2024-01-27 | End: 2024-01-30

## 2024-01-27 RX ORDER — LANOLIN ALCOHOL/MO/W.PET/CERES
3 CREAM (GRAM) TOPICAL AT BEDTIME
Refills: 0 | Status: DISCONTINUED | OUTPATIENT
Start: 2024-01-27 | End: 2024-01-30

## 2024-01-27 RX ORDER — SODIUM CHLORIDE 9 MG/ML
1000 INJECTION, SOLUTION INTRAVENOUS
Refills: 0 | Status: DISCONTINUED | OUTPATIENT
Start: 2024-01-27 | End: 2024-01-30

## 2024-01-27 RX ORDER — INSULIN LISPRO 100/ML
VIAL (ML) SUBCUTANEOUS AT BEDTIME
Refills: 0 | Status: DISCONTINUED | OUTPATIENT
Start: 2024-01-27 | End: 2024-01-30

## 2024-01-27 RX ORDER — INSULIN ASPART 100 [IU]/ML
0 INJECTION, SOLUTION SUBCUTANEOUS
Refills: 0 | DISCHARGE

## 2024-01-27 RX ORDER — CHOLECALCIFEROL (VITAMIN D3) 125 MCG
1 CAPSULE ORAL
Refills: 0 | DISCHARGE

## 2024-01-27 RX ORDER — DEXTROSE 50 % IN WATER 50 %
25 SYRINGE (ML) INTRAVENOUS ONCE
Refills: 0 | Status: DISCONTINUED | OUTPATIENT
Start: 2024-01-27 | End: 2024-01-30

## 2024-01-27 RX ORDER — TIOTROPIUM BROMIDE 18 UG/1
2 CAPSULE ORAL; RESPIRATORY (INHALATION) DAILY
Refills: 0 | Status: DISCONTINUED | OUTPATIENT
Start: 2024-01-27 | End: 2024-01-30

## 2024-01-27 RX ORDER — METOPROLOL TARTRATE 50 MG
25 TABLET ORAL DAILY
Refills: 0 | Status: DISCONTINUED | OUTPATIENT
Start: 2024-01-27 | End: 2024-01-30

## 2024-01-27 RX ORDER — BUMETANIDE 0.25 MG/ML
4 INJECTION INTRAMUSCULAR; INTRAVENOUS AT BEDTIME
Refills: 0 | Status: DISCONTINUED | OUTPATIENT
Start: 2024-01-27 | End: 2024-01-30

## 2024-01-27 RX ORDER — HEPARIN SODIUM 5000 [USP'U]/ML
5000 INJECTION INTRAVENOUS; SUBCUTANEOUS EVERY 8 HOURS
Refills: 0 | Status: COMPLETED | OUTPATIENT
Start: 2024-01-27 | End: 2024-01-29

## 2024-01-27 RX ORDER — FINERENONE 20 MG/1
1 TABLET, FILM COATED ORAL
Refills: 0 | DISCHARGE

## 2024-01-27 RX ORDER — METOPROLOL TARTRATE 50 MG
1 TABLET ORAL
Refills: 0 | DISCHARGE

## 2024-01-27 RX ORDER — TRAMADOL HYDROCHLORIDE 50 MG/1
25 TABLET ORAL EVERY 6 HOURS
Refills: 0 | Status: DISCONTINUED | OUTPATIENT
Start: 2024-01-27 | End: 2024-01-30

## 2024-01-27 RX ORDER — LEVOTHYROXINE SODIUM 125 MCG
175 TABLET ORAL DAILY
Refills: 0 | Status: DISCONTINUED | OUTPATIENT
Start: 2024-01-27 | End: 2024-01-30

## 2024-01-27 RX ORDER — BUDESONIDE AND FORMOTEROL FUMARATE DIHYDRATE 160; 4.5 UG/1; UG/1
2 AEROSOL RESPIRATORY (INHALATION)
Refills: 0 | Status: DISCONTINUED | OUTPATIENT
Start: 2024-01-27 | End: 2024-01-30

## 2024-01-27 RX ORDER — BUMETANIDE 0.25 MG/ML
1 INJECTION INTRAMUSCULAR; INTRAVENOUS
Refills: 0 | DISCHARGE

## 2024-01-27 RX ORDER — ACETAMINOPHEN 500 MG
1000 TABLET ORAL ONCE
Refills: 0 | Status: COMPLETED | OUTPATIENT
Start: 2024-01-27 | End: 2024-01-27

## 2024-01-27 RX ADMIN — Medication 20 MILLIEQUIVALENT(S): at 19:54

## 2024-01-27 RX ADMIN — HEPARIN SODIUM 5000 UNIT(S): 5000 INJECTION INTRAVENOUS; SUBCUTANEOUS at 21:25

## 2024-01-27 RX ADMIN — SODIUM CHLORIDE 1000 MILLILITER(S): 9 INJECTION INTRAMUSCULAR; INTRAVENOUS; SUBCUTANEOUS at 15:12

## 2024-01-27 RX ADMIN — Medication 400 MILLIGRAM(S): at 15:12

## 2024-01-27 RX ADMIN — ATORVASTATIN CALCIUM 40 MILLIGRAM(S): 80 TABLET, FILM COATED ORAL at 21:27

## 2024-01-27 RX ADMIN — BUMETANIDE 4 MILLIGRAM(S): 0.25 INJECTION INTRAMUSCULAR; INTRAVENOUS at 21:31

## 2024-01-27 NOTE — H&P ADULT - PROBLEM SELECTOR PLAN 9
DVT ppx: Continue home Eliquis Patient with known hx of COPD  - Continue home medications: Trelegy Patient with known hx of COPD  - Continue home medications: Trelegy, continue therapeutic interchange

## 2024-01-27 NOTE — ED PROVIDER NOTE - ATTENDING APP SHARED VISIT CONTRIBUTION OF CARE
Patient is a 78-year-old male with a history of A-fib on Eliquis, CAD, CHF, DM, HTN.  Status post shoulder surgery and appendicitis surgery.  Primary care physician Dr. Kashmir Hwang.  Approximately 3 days ago he was placing his coat on, and stepped backwards to get out of the way of others.  When he stepped back he fell after he lost his balance injuring his left hip.  3 days later the pain had progressed to the point where he is unable to bear weight on his left hip and feels pain in his groin and left thigh.  He saw his primary orthopedist Dr. Hugo Boyle who did x-rays in the office and was unable to find a fracture.  But due to the persistence of symptoms patient was sent to the emergency room for evaluation.  Patient denies striking his head.  He denies chest pain or shortness of breath.  He denies weakness or numbness.    On evaluation is a well-developed well-nourished obese male in no apparent distress.  HEENT is unremarkable.  Neck is supple.  Cardiac exam is regular with a murmur.  Lungs are clear to auscultation.  Abdomen is soft nontender obese with a ventral hernia.  No guarding or rebound.  Musculoskeletal exam patient has pain at the left hip with range of motion.  There is no shortening or external rotation.  He has bilateral lower extremity venous stasis disease on his legs for which he wears wraps.  This is not new.  There is no evidence of infection or cellulitis or ischemia.  He has abrasions to his knee on the left side.  Neurologic exam is normal.    Plan of care includes CT imaging of the pelvis, admission blood work, type and screen, COVID testing chest x-ray urinalysis EKG all and the plans for admission for patient who fell and is unable to bear weight.  This chart was made with dictation software and may contain typographical errors.

## 2024-01-27 NOTE — H&P ADULT - NSHPSOCIALHISTORY_GEN_ALL_CORE
Marital Status:   Living Situation:   ADLs/Mobility:   Occupation:   Tobacco Use:   Alcohol Use:   Drug Use:   Sexual History:   Recent Travel:  Vaccinations: Marital Status:   Living Situation: lives with wife and daughter  ADLs/Mobility: independent, fully functional at baseline without assistive devices; used cane s/p fall on 1/24/2024  Occupation:  for 20 years  Tobacco Use: former 2[employer]5 [employer] smoker, quit 2015  Alcohol Use: social use  Drug Use: denies

## 2024-01-27 NOTE — H&P ADULT - PROBLEM SELECTOR PLAN 7
Patient with known hx of COPD  - Continue home medications: Trelegy Chronic,   On Synthroid 175  mcg at home  Continue Synthroid

## 2024-01-27 NOTE — H&P ADULT - PROBLEM SELECTOR PLAN 1
Patient presents after fall affecting L hip  CT pelvis demonstrates acute minimally displaced transverse fracture through the base of left greater trochanter. No convincing CT evidence for intertrochanteric extension.  MR L Hip ordered to assess for IT extension  PT/OT ordered   WBAT given low suspicion of IT extension  Pain control as ordered   Ice as tolerated   Ortho following, recs appreciated Patient presents after fall affecting L hip  CT pelvis demonstrates acute minimally displaced transverse fracture through the base of left greater trochanter. No convincing CT evidence for intertrochanteric extension.  MR L Hip ordered to assess for IT extension  PT/OT ordered   WBAT given low suspicion of IT extension  Pain control as ordered   Ortho following, recs appreciated

## 2024-01-27 NOTE — H&P ADULT - PROBLEM SELECTOR PLAN 2
Patient with history of CHF, follows with [facility]   - Asymptomatic with no signs or symptoms of volume overload  - Continue home medications: bumex, metoprolol   - Strict I/Os, daily weights  - Monitor and replace electrolytes Patient with history of CHF, follows with [facility]   - Asymptomatic with no signs or symptoms of volume overload  - Continue home medications: bumetanide, metoprolol   - Strict I/Os, daily weights  - Monitor and replace electrolytes

## 2024-01-27 NOTE — PATIENT PROFILE ADULT - HAVE YOU BEEN EATING POORLY BECAUSE OF A DECREASED APPETITE?
I personally saw the patient and performed a substantive portion of the visit including all aspects of the medical decision making. EKG: NSR rate 76, normal axis, QTC WNL, no acute ST or T wave abnormalities. No priors. Patient with substernal chest pain today, nausea, dizziness. Last cardiac work-up he states was at least 3 years ago. Multiple risk factors giving him a heart score of 5. Initial EKG and troponin are unremarkable. Plan for hospitalist admission for further work-up. For other details regarding this encounter please see Amy SERVIN's documentation.        Holland Alcantar MD  10/28/22 2039 No (0)

## 2024-01-27 NOTE — ED ADULT NURSE NOTE - OBJECTIVE STATEMENT
patient alert and oriented from home presenting to ED with left leg weakness.  patient states he has fallen 2x since yesterday.  patient went to ortho who found no fractures but patient is still falling.

## 2024-01-27 NOTE — H&P ADULT - NSICDXPASTSURGICALHX_GEN_ALL_CORE_FT
PAST SURGICAL HISTORY:  History of prior ablation treatment      PAST SURGICAL HISTORY:  H/O rhinoplasty     H/O shoulder surgery     History of appendectomy     History of prior ablation treatment     History of tonsillectomy

## 2024-01-27 NOTE — ED PROVIDER NOTE - CLINICAL SUMMARY MEDICAL DECISION MAKING FREE TEXT BOX
Patient presents s/p multiple falls over 3 days with worsening left hip pain, x-ray negative for fracture, Dr. Smith ortho advised to come to ED for further workup.       Plan- CT Pelvis bony, pain management, labs, reassess. Patient presents s/p multiple falls over 3 days with worsening left hip pain, x-ray negative for fracture, Dr. Smith ortho advised to come to ED for further workup.       Plan- CT Pelvis bony, pain management, labs, reassess.  ED MD:  Patient is a 78-year-old male with a history of A-fib on Eliquis, CAD, CHF, DM, HTN.  Status post shoulder surgery and appendicitis surgery.  Primary care physician Dr. Kashmir Hwang.  Approximately 3 days ago he was placing his coat on, and stepped backwards to get out of the way of others.  When he stepped back he fell after he lost his balance injuring his left hip.  3 days later the pain had progressed to the point where he is unable to bear weight on his left hip and feels pain in his groin and left thigh.  He saw his primary orthopedist Dr. Hugo Boyle who did x-rays in the office and was unable to find a fracture.  But due to the persistence of symptoms patient was sent to the emergency room for evaluation.  Patient denies striking his head.  He denies chest pain or shortness of breath.  He denies weakness or numbness.    On evaluation is a well-developed well-nourished obese male in no apparent distress.  HEENT is unremarkable.  Neck is supple.  Cardiac exam is regular with a murmur.  Lungs are clear to auscultation.  Abdomen is soft nontender obese with a ventral hernia.  No guarding or rebound.  Musculoskeletal exam patient has pain at the left hip with range of motion.  There is no shortening or external rotation.  He has bilateral lower extremity venous stasis disease on his legs for which he wears wraps.  This is not new.  There is no evidence of infection or cellulitis or ischemia.  He has abrasions to his knee on the left side.  Neurologic exam is normal.    Plan of care includes CT imaging of the pelvis, admission blood work, type and screen, COVID testing chest x-ray urinalysis EKG all and the plans for admission for patient who fell and is unable to bear weight.  This chart was made with dictation software and may contain typographical errors.

## 2024-01-27 NOTE — H&P ADULT - NSICDXPASTMEDICALHX_GEN_ALL_CORE_FT
PAST MEDICAL HISTORY:  Chronic atrial fibrillation     Congestive heart disease     DM2 (diabetes mellitus, type 2)     HTN (hypertension)      PAST MEDICAL HISTORY:  Chronic atrial fibrillation     Congestive heart disease     COPD (chronic obstructive pulmonary disease)     DM2 (diabetes mellitus, type 2)     HTN (hypertension)     Hypothyroidism

## 2024-01-27 NOTE — CONSULT NOTE ADULT - SUBJECTIVE AND OBJECTIVE BOX
Patient is a 78yMale ambulator without assistive devices who presents to [facility] ED w/ a c/o of L hip pain. Pt states that he fell on Wednesday but was able to walk after the fall, denies any HT/LOC. Pt states that he fell again on Friday and since then patient has been able to stand but with difficulty and inability to walk. Patient saw Dr. Praod in the office yesterday and was instructed to come to ED today if pain worsens. Denies any numbness or tingling. Denies having any other pain elsewhere. Previous R shoulder TSA. No other orthopedic concerns at this time.    T(C): 36.7 (01-27-24 @ 16:34), Max: 36.7 (01-27-24 @ 14:19)  HR: 77 (01-27-24 @ 16:34) (77 - 83)  BP: 137/85 (01-27-24 @ 16:34) (137/85 - 144/79)  RR: 18 (01-27-24 @ 16:34) (16 - 18)  SpO2: 97% (01-27-24 @ 16:34) (96% - 97%)        FH: type 2 diabetes    MEWS Score    HTN (hypertension)    DM2 (diabetes mellitus, type 2)    Chronic atrial fibrillation    Congestive heart disease    History of prior ablation treatment    FALL    90+    SysAdmin_VisitLink        Gen: NAD, Resting comfortably    LLE:  Lower extremity wounds, 2/2 lower extremity vascular issues  Skin intact at hip, no erythema or ecchymosis  Able to SLR  Mild TTP by hip, nowhere else along RLE  Motor: + EHL/FHL/TA/GSC  +SILT: SPN/DPN/Clara/Saph/Tib  + DP  Compartments soft and compressible  No calf tenderness  Pain on axial load and log roll    Secondary Assessment:  NC/AT, NTTP of clavicles, NTTP of C-,T-,L-Spine,  UEs: NTTP of Shoulders, Elbows, Wrists, Hands; NT with AROM/PROM of Shoulders, Elbows, Wrists, Hands; AIN/PIN/Med/Uln/Msc/Rad/Ax intact  R LE: Able to SLR, NT with Log Roll, NT with Heel Strike, NTTP of Hip, Knee, Ankle, foot; NT with AROM/PROM of Hip, Knee, Ankle, foot; Q/H/Gsc/TA/EHL/FHL intact    Imaging:  CT: GT fx without evidence of IT extension    A/P:     78yMale with L GT fx.     FU MR L Hip, ordered to assess for IT extension  PT/OT Daily  WBAT given low suspicion of IT extension  OK to continue with chemical DVT ppx at this time, no contraindications  Analgesia   Ice hip as tolerated  Medical recommendations appreciated   Discussed with Dr. Prado, who is in agreement with above plan Patient is a 78yMale ambulator without assistive devices who presents to [facility] ED w/ a c/o of L hip pain. Pt states that he fell on Wednesday but was able to walk after the fall, denies any HT/LOC. Pt states that he fell again on Friday and since then patient has been able to stand but with difficulty and inability to walk. Patient saw Dr. Prado in the office yesterday and was instructed to come to ED today if pain worsens. Denies any numbness or tingling. Denies having any other pain elsewhere. Previous R shoulder TSA. No other orthopedic concerns at this time.    T(C): 36.7 (01-27-24 @ 16:34), Max: 36.7 (01-27-24 @ 14:19)  HR: 77 (01-27-24 @ 16:34) (77 - 83)  BP: 137/85 (01-27-24 @ 16:34) (137/85 - 144/79)  RR: 18 (01-27-24 @ 16:34) (16 - 18)  SpO2: 97% (01-27-24 @ 16:34) (96% - 97%)        FH: type 2 diabetes    MEWS Score    HTN (hypertension)    DM2 (diabetes mellitus, type 2)    Chronic atrial fibrillation    Congestive heart disease    History of prior ablation treatment    FALL    90+    SysAdmin_VisitLink        Gen: NAD, Resting comfortably    LLE:  Lower extremity wounds, 2/2 lower extremity vascular issues  Skin intact at hip, no erythema or ecchymosis  Able to SLR  Mild TTP by hip, nowhere else along RLE  Motor: + EHL/FHL/TA/GSC  +SILT: SPN/DPN/Clara/Saph/Tib  + DP  Compartments soft and compressible  No calf tenderness  Pain on axial load and log roll    Secondary Assessment:  NC/AT, NTTP of clavicles, NTTP of C-,T-,L-Spine,  UEs: NTTP of Shoulders, Elbows, Wrists, Hands; NT with AROM/PROM of Shoulders, Elbows, Wrists, Hands; AIN/PIN/Med/Uln/Msc/Rad/Ax intact  R LE: Able to SLR, NT with Log Roll, NT with Heel Strike, NTTP of Hip, Knee, Ankle, foot; NT with AROM/PROM of Hip, Knee, Ankle, foot; Q/H/Gsc/TA/EHL/FHL intact    Imaging:  CT: GT fx without evidence of IT extension    A/P:     78yMale with L GT fx.     FU MR L Hip, ordered to assess for IT extension  PT/OT Daily  WBAT given low suspicion of IT extension  Admit to Medicine  OK to continue with chemical DVT ppx at this time, no contraindications  Analgesia   Ice hip as tolerated  Medical recommendations appreciated   Discussed with Dr. Prado, who is in agreement with above plan Patient is a 78yMale ambulator without assistive devices who presents to [facility] ED w/ a c/o of L hip pain. Pt states that he fell on Wednesday but was able to walk after the fall, denies any HT/LOC. Pt states that he fell again on Friday and since then patient has been able to stand but with difficulty and inability to walk. Patient saw Dr. Prado in the office yesterday and was instructed to come to ED today if pain worsens. Denies any numbness or tingling. Denies having any other pain elsewhere. Previous R shoulder TSA. No other orthopedic concerns at this time.    T(C): 36.7 (01-27-24 @ 16:34), Max: 36.7 (01-27-24 @ 14:19)  HR: 77 (01-27-24 @ 16:34) (77 - 83)  BP: 137/85 (01-27-24 @ 16:34) (137/85 - 144/79)  RR: 18 (01-27-24 @ 16:34) (16 - 18)  SpO2: 97% (01-27-24 @ 16:34) (96% - 97%)        FH: type 2 diabetes    MEWS Score    HTN (hypertension)    DM2 (diabetes mellitus, type 2)    Chronic atrial fibrillation    Congestive heart disease    History of prior ablation treatment    FALL    90+    SysAdmin_VisitLink        Gen: NAD, Resting comfortably    LLE:  Lower extremity wounds, 2/2 lower extremity vascular issues  Skin intact at hip, no erythema or ecchymosis  Able to SLR  Mild TTP by hip, nowhere else along RLE  Motor: + EHL/FHL/TA/GSC  +SILT: SPN/DPN/Clara/Saph/Tib  + DP  Compartments soft and compressible  No calf tenderness  Pain on axial load and log roll    Secondary Assessment:  NC/AT, NTTP of clavicles, NTTP of C-,T-,L-Spine,  UEs: NTTP of Shoulders, Elbows, Wrists, Hands; NT with AROM/PROM of Shoulders, Elbows, Wrists, Hands; AIN/PIN/Med/Uln/Msc/Rad/Ax intact  R LE: Able to SLR, NT with Log Roll, NT with Heel Strike, NTTP of Hip, Knee, Ankle, foot; NT with AROM/PROM of Hip, Knee, Ankle, foot; Q/H/Gsc/TA/EHL/FHL intact    Imaging:  CT: GT fx without evidence of IT extension    A/P:     78yMale with L GT fx.     FU MR L Hip, ordered to assess for IT extension  PT/OT Daily  WBAT given low suspicion of IT extension  Admit to Medicine  Please hold all SGLT2 inhibitors for diabetes, given that it is a contraindication to surgery  OK to continue with chemical DVT ppx at this time, no contraindications  Analgesia   Ice hip as tolerated  Medical recommendations appreciated   Discussed with Dr. Prado, who is in agreement with above plan

## 2024-01-27 NOTE — PATIENT PROFILE ADULT - FALL HARM RISK - TYPE OF ASSESSMENT
Here today for presurgical assessment prior to circumcision scheduled for 2021 at [facility] with Dr. Balwinder Rios. Admission

## 2024-01-27 NOTE — H&P ADULT - PROBLEM SELECTOR PLAN 8
Chronic  - Continue home medications: pepcid Chronic  - Continue home rosuvastatin Chronic  - Continue home rosuvastatin -- interchange with atorvastatin

## 2024-01-27 NOTE — H&P ADULT - ASSESSMENT
79yo M PMH chronic atrial fibrillation on Eliquis (s/p ablation 2021, [facility]), CHF, COPD, DM2 on Mounjaro, Jardiance and novolog sliding scale, HTN, GERD presents to [facility] ED w/ a c/o of L hip pain. Admitted for MRI L hip planned for 1/29 and possible further intervention pending MR results.  77yo M PMH chronic atrial fibrillation on Eliquis (s/p ablation 2021, [facility]), CHF, COPD, DM2 on Mounjaro, Jardiance and novolog sliding scale, HTN, GERD presents to [facility] ED w/ a c/o of L hip pain. Admitted for MRI L hip planned for 1/29 and possible surgical intervention pending MR results.

## 2024-01-27 NOTE — H&P ADULT - NSHPREVIEWOFSYSTEMS_GEN_ALL_CORE
CONSTITUTIONAL: denies fever, chills, fatigue, weakness  HEENT: denies blurred vision, sore throat  SKIN: denies new lesions, rash  CARDIOVASCULAR: denies chest pain, chest pressure, palpitations  RESPIRATORY: denies shortness of breath, sputum production  GASTROINTESTINAL: denies nausea, vomiting, diarrhea, abdominal pain  GENITOURINARY: denies dysuria, discharge  NEUROLOGICAL: denies numbness, headache, focal weakness  MUSCULOSKELETAL: denies new joint pain, muscle aches  HEMATOLOGIC: denies gross bleeding, bruising  LYMPHATICS: denies enlarged lymph nodes, extremity swelling  PSYCHIATRIC: denies recent changes in anxiety, depression  ENDOCRINOLOGIC: denies sweating, cold or heat intolerance CONSTITUTIONAL: denies fever, chills, fatigue, weakness  HEENT: denies blurred vision, sore throat  SKIN: denies new lesions, rash  CARDIOVASCULAR: denies chest pain, chest pressure, palpitations  RESPIRATORY: denies shortness of breath, sputum production  GASTROINTESTINAL: denies nausea, vomiting, diarrhea, abdominal pain  GENITOURINARY: denies dysuria, discharge  NEUROLOGICAL: denies numbness, headache, focal weakness  MUSCULOSKELETAL: +L hip and thigh pain 2/2 fall  HEMATOLOGIC: denies gross bleeding, bruising  LYMPHATICS: denies enlarged lymph nodes, extremity swelling  PSYCHIATRIC: denies recent changes in anxiety, depression  ENDOCRINOLOGIC: denies sweating, cold or heat intolerance

## 2024-01-27 NOTE — PATIENT PROFILE ADULT - NSPROPOAURINARYCATHETER_GEN_A_NUR
Patient unavailable x 2 attempts as patient was on the phone when Public Health Nurse (PHN) stopped by to discuss [facility] Public Health resources.     no

## 2024-01-27 NOTE — ED PROVIDER NOTE - CARE PLAN
1 Principal Discharge DX:	Greater trochanter fracture  Assessment and plan of treatment:	Spoke with Dr. villela, will admit to Hospital possible surgery

## 2024-01-27 NOTE — ED PROVIDER NOTE - NSICDXPASTMEDICALHX_GEN_ALL_CORE_FT
PAST MEDICAL HISTORY:  Chronic atrial fibrillation     Congestive heart disease     DM2 (diabetes mellitus, type 2)     HTN (hypertension)

## 2024-01-27 NOTE — ED ADULT NURSE NOTE - NSFALLHARMRISKINTERV_ED_ALL_ED

## 2024-01-27 NOTE — ED PROVIDER NOTE - NS ED ROS FT
+Pain with ambulation and movement, denies numbness or tingling. Endorses multiple falls precipitating pain.

## 2024-01-27 NOTE — H&P ADULT - PROBLEM SELECTOR PLAN 3
Chronic, known history of T2DM  - Hold home meds: mounjaro, jardiance   - Low dose insulin corrective scale  - Hypoglycemia protocol, fingerstick glucose QAC&HS   - Consistent carb/DASH diet  - F/u AM HbA1c   - Per ortho, will hold all SGLT2 inhibitors for diabetes, given that it is a contraindication to surgery

## 2024-01-27 NOTE — H&P ADULT - PROBLEM SELECTOR PLAN 5
Patient with history of CKD  SANTOS (on CKD) likely 2/2 pre-renal azotemia in the setting of dehydration, GIB, poor PO intake, medications (NSAIDs, ACE inhibitors, chemotherapy, IV contrast)  - Creatinine currently 1.4, appears to be at baseline   - Per chart review, Cr was previously 1.5 in 07/2023  - Monitor BMP  - Takes home Kerendia, bumetanide   - Avoid nephrotoxic medications as able Patient with history of CKD, stage 3 based on previous GFR  - Creatinine currently 1.4, appears to be at baseline   - Per chart review, Cr was previously 1.5 in 07/2023  - Monitor BMP  - Takes home Kerendia, bumetanide   - Patient to bring Kerendia (filerenone) from home  - Continue home bumetanide  - Avoid nephrotoxic medications as able

## 2024-01-27 NOTE — H&P ADULT - PROBLEM SELECTOR PLAN 6
Chronic, stable on admission  - Continue home medications -metoprolol, bumex- with hold parameters  - Monitor routine hemodynamics Chronic, stable on admission  - Continue home medications -metoprolol, bumetanide-- with hold parameters  - Monitor routine hemodynamics

## 2024-01-27 NOTE — ED ADULT NURSE NOTE - CHIEF COMPLAINT QUOTE
s/p fall *3 left leg has been giving out - patient saw his ortho doc- xray's done, patient fell yesterday and today, denies hitting head. patient on eliquiz

## 2024-01-27 NOTE — PATIENT PROFILE ADULT - FALL HARM RISK - HARM RISK INTERVENTIONS
Communicate Risk of Fall with Harm to all staff/Reinforce activity limits and safety measures with patient and family/Tailored Fall Risk Interventions/Visual Cue: Yellow wristband and red socks/Bed in lowest position, wheels locked, appropriate side rails in place/Call bell, personal items and telephone in reach/Instruct patient to call for assistance before getting out of bed or chair/Non-slip footwear when patient is out of bed/[place] to call system/Physically safe environment - no spills, clutter or unnecessary equipment/Purposeful Proactive Rounding/Room/bathroom lighting operational, light cord in reach Assistance with ambulation/Assistance OOB with selected safe patient handling equipment/Communicate Risk of Fall with Harm to all staff/Reinforce activity limits and safety measures with patient and family/Tailored Fall Risk Interventions/Visual Cue: Yellow wristband and red socks/Bed in lowest position, wheels locked, appropriate side rails in place/Call bell, personal items and telephone in reach/Instruct patient to call for assistance before getting out of bed or chair/Non-slip footwear when patient is out of bed/[place] to call system/Physically safe environment - no spills, clutter or unnecessary equipment/Purposeful Proactive Rounding/Room/bathroom lighting operational, light cord in reach

## 2024-01-27 NOTE — H&P ADULT - NSICDXFAMILYHX_GEN_ALL_CORE_FT
FAMILY HISTORY:  FH: type 2 diabetes     FAMILY HISTORY:  No pertinent family history in first degree relatives

## 2024-01-27 NOTE — ED PROVIDER NOTE - OBJECTIVE STATEMENT
78 y male with PMHx of Chronic atrial fibrillation on eliquis, Congestive heart disease, DM2, HTN, presents to ED with complaint of multiple falls. Patient states "3 days ago, and I fell backwards and injured my left hip. I fell another time and saw Dr. Smith and had an x-ray which did not show a hip fracture" Patient endorses he can ambulate at home with a cane, today pain is worsening. Spoke with Dr. Smith a second time and advised that he come to the ED and have a further work up.

## 2024-01-27 NOTE — H&P ADULT - NSHPPHYSICALEXAM_GEN_ALL_CORE
T(C): 36.7 (01-27-24 @ 16:34), Max: 36.7 (01-27-24 @ 14:19)  HR: 77 (01-27-24 @ 16:34) (77 - 83)  BP: 137/85 (01-27-24 @ 16:34) (137/85 - 144/79)  RR: 18 (01-27-24 @ 16:34) (16 - 18)  SpO2: 97% (01-27-24 @ 16:34) (96% - 97%)  Wt(kg): --    Physical Exam:   GENERAL: well-groomed, well-developed, NAD  HEENT: head NC/AT; EOM intact, PERRLA, conjunctiva & sclera clear; hearing grossly intact, moist mucous membranes  NECK: supple, no JVD  RESPIRATORY: CTA B/L, no wheezing, rales, rhonchi or rubs  CARDIOVASCULAR: S1&S2, RRR, no murmurs or gallops  ABDOMEN: soft, non-tender, non-distended, + Bowel sounds x4 quadrants, no guarding, rebound or rigidity  MUSCULOSKELETAL:  no clubbing, cyanosis or edema of all 4 extremities  LYMPH: no cervical lymphadenopathy  VASCULAR: Radial pulses 2+ bilaterally, no varicose veins   SKIN: warm and dry, color normal  NEUROLOGIC: AA&O X3, CN2-12 intact w/ no focal deficits, no sensory loss, motor Strength 5/5 in UE & LE B/L  Psych: Normal mood and affect, normal behavior T(C): 36.7 (01-27-24 @ 16:34), Max: 36.7 (01-27-24 @ 14:19)  HR: 77 (01-27-24 @ 16:34) (77 - 83)  BP: 137/85 (01-27-24 @ 16:34) (137/85 - 144/79)  RR: 18 (01-27-24 @ 16:34) (16 - 18)  SpO2: 97% (01-27-24 @ 16:34) (96% - 97%)  Wt(kg): --    Physical Exam:   GENERAL: well-groomed, NAD  HEENT: head NC/AT; EOM intact, PERRLA, conjunctiva & sclera clear; hearing grossly intact, moist mucous membranes  RESPIRATORY: CTA B/L, no wheezing, rales, rhonchi or rubs  CARDIOVASCULAR: S1&S2, irregular rhythm  ABDOMEN: soft, non-tender, non-distended, + Bowel sounds x4 quadrants  MUSCULOSKELETAL:  no clubbing, cyanosis or edema of all 4 extremities; +L hip tender to palpation; ROM limited LLE to pain   LYMPH: no cervical lymphadenopathy  VASCULAR: Radial pulses 2+ bilaterally  SKIN: cool extremities,  venous stasis changes in bilateral lower extremities  NEUROLOGIC: AA&O X3, no focal deficits, no sensory loss, motor Strength 5/5 in UE and LE  Psych: Normal mood and affect, normal behavior

## 2024-01-27 NOTE — ED ADULT NURSE REASSESSMENT NOTE - NS ED NURSE REASSESS COMMENT FT1
patient alert and oriented. No complaints of pain or discomfort noted. patient  patient to be eval by PT. patient waiting for MRI of the Left hip. depending on the results of MRI patient may need surgery.

## 2024-01-27 NOTE — H&P ADULT - HISTORY OF PRESENT ILLNESS
77yo M PMH Chronic atrial fibrillation on eliquis, Congestive heart disease, DM2, HTN, presents to [facility] ED w/ a c/o of L hip pain. Pt states that he fell on Wednesday but was able to walk after the fall, denies any HT/LOC. Pt states that he fell again on Friday and since then patient has been able to stand but with difficulty and inability to walk. Patient saw Dr. Prado in the office yesterday and was instructed to come to ED today if pain worsens. Denies any numbness or tingling. Denies having any other pain elsewhere. Previous R shoulder TSA. No other orthopedic concerns at this time.    ED Course:   Vitals: BP: 144/79 --> 137/85, HR: 83, Temp: 98.1F, RR: 16, SpO2: 97% on RA  Labs: H/H 15.2/45.7 within normal; K low 3.4, BUN elevated 33, Creatinine elevated 1.4  UA: glucose >1000, negative nitrites, negative LE, negative ketones, trace protein, negative blood  CT pelvis:   Osseous: Acute minimally displaced transverse fracture through the base   of left greater trochanter. No convincing CT evidence for   intertrochanteric extension. Moderate bilateral hip arthrosis without   significant effusions. Sacroiliac joints and pubic symphysis are   maintained. Advanced lower lumbar discogenic spondylosis, otherwise   incompletely evaluated. No aggressive osseous neoplasm.  Soft tissues: No sizable hyperattenuating hematoma or drainable   encapsulated fluid collection. No pelvic free fluid.    EKG:   Received in the ED   NS bolus 1L x1, Ofirmev x1   77yo M PMH chronic atrial fibrillation on Eliquis (s/p ablation 2021, CardioMEMS), CHF, COPD, DM2 on Mounjaro, Jardiance and novolog sliding scale, HTN, GERD presents to \[facility]\"" ED w/ a c/o of L hip pain. Pt states that he fell on1/24/2024 Wednesday but was able to walk after the fall. Patient describes he fell backwards while attempting to hold the door and landed on his left hip. Denies headstrike, loss of consciousness. Patient did not feel well enough to go to work on Thursday. On Friday, patient  reports he could not put any weight on his L leg. Pt states that he fell again on Friday 1/26/2024, landing on his rear and since then patient has been able to stand but with difficulty and has not been able to walk. Patient saw Dr. Prado in the office n Friday 1/24 and was instructed to come to ED today. Denies any numbness or tingling. Denies having any other pain elsewhere. Previous R shoulder TSA. Patient was BIBEMS to \[facility]\"" hospital. At rest, patient denies pain. Admits to pain solely with movement and weight bearing.     ED Course:   Vitals: BP: 144/79 --> 137/85, HR: 83, Temp: 98.1F, RR: 16, SpO2: 97% on RA  Labs: H/H 15.2/45.7 within normal; K low 3.4, BUN elevated 33, Creatinine elevated 1.4  UA: glucose >1000, negative nitrites, negative LE, negative ketones, trace protein, negative blood  CT pelvis:   Osseous: Acute minimally displaced transverse fracture through the base   of left greater trochanter. No convincing CT evidence for   intertrochanteric extension. Moderate bilateral hip arthrosis without   significant effusions. Sacroiliac joints and pubic symphysis are   maintained. Advanced lower lumbar discogenic spondylosis, otherwise   incompletely evaluated. No aggressive osseous neoplasm.  Soft tissues: No sizable hyperattenuating hematoma or drainable   encapsulated fluid collection. No pelvic free fluid.    EKG: pending   Received in the ED   NS bolus 1L x1, Ofirmev x1   79yo M PMH chronic atrial fibrillation on Eliquis (s/p ablation 2021, CardioMEMS), CHF, COPD, DM2 on Mounjaro, Jardiance and novolog sliding scale, HTN, GERD presents to [facility] ED w/ a c/o of L hip pain. Pt states that he fell on1/24/2024 Wednesday but was able to walk after the fall. Patient describes he fell backwards while attempting to hold the door and landed on his left hip. Denies headstrike, loss of consciousness. Patient did not feel well enough to go to work on Thursday. On Friday, patient  reports he could not put any weight on his L leg. Pt states that he fell again on Friday 1/26/2024, landing on his rear and since then patient has been able to stand but with difficulty and has not been able to walk. Patient saw Dr. Prado in the office n Friday 1/24 and was instructed to come to ED today. Denies any numbness or tingling. Denies having any other pain elsewhere. Previous R shoulder TSA. Patient was BIBEMS to [facility] hospital. At rest, patient denies pain. Admits to pain solely with movement and weight bearing.     ED Course:   Vitals: BP: 144/79 --> 137/85, HR: 83, Temp: 98.1F, RR: 16, SpO2: 97% on RA  Labs: H/H 15.2/45.7 within normal; K low 3.4, BUN elevated 33, Creatinine elevated 1.4  UA: glucose >1000, negative nitrites, negative LE, negative ketones, trace protein, negative blood  CT pelvis:   Osseous: Acute minimally displaced transverse fracture through the base   of left greater trochanter. No convincing CT evidence for   intertrochanteric extension. Moderate bilateral hip arthrosis without   significant effusions. Sacroiliac joints and pubic symphysis are   maintained. Advanced lower lumbar discogenic spondylosis, otherwise   incompletely evaluated. No aggressive osseous neoplasm.  Soft tissues: No sizable hyperattenuating hematoma or drainable   encapsulated fluid collection. No pelvic free fluid.    EKG: Sinus rhythm with 1st degree AV block with occasional premature ventricular complexes, VR 79  Received in the ED   NS bolus 1L x1, Ofirmev x1

## 2024-01-27 NOTE — H&P ADULT - PROBLEM SELECTOR PLAN 4
Chronic  Remote tele monitoring  Rate control: metoprolol  with hold parameters  Anticoagulation with home Eliquis Chronic  Remote tele monitoring  Rate control: metoprolol  with hold parameters  Discontinue anticoagulation with home Eliquis  AC with [facility] Chronic  Remote tele monitoring  Rate control: metoprolol with hold parameters  Discontinue anticoagulation with home Eliquis as patient may need ortho surgery  AC with [facility]

## 2024-01-27 NOTE — H&P ADULT - ATTENDING COMMENTS
79yo M PMH Chronic atrial fibrillation on eliquis, Congestive heart disease, DM2, HTN, presents to [facility] ED w/ a c/o of L hip pain in setting of recent fall.  Patient saw Dr. Prado in the office yesterday and was instructed to come to ED today if pain worsens. Work up revealing acute minimally displaced transverse fracture through the base of left greater trochanter. Patient evaluated by Ortho with plan to obtain MRI. Will have patient work with PT and manage pain with analgesics and ice per ortho recommendations. Will continue patient home medications. Rest per resident note above.

## 2024-01-28 LAB
A1C WITH ESTIMATED AVERAGE GLUCOSE RESULT: 6.7 % — HIGH (ref 4–5.6)
ALBUMIN SERPL ELPH-MCNC: 2.9 G/DL — LOW (ref 3.3–5)
ALP SERPL-CCNC: 104 U/L — SIGNIFICANT CHANGE UP (ref 40–120)
ALT FLD-CCNC: 22 U/L — SIGNIFICANT CHANGE UP (ref 12–78)
ANION GAP SERPL CALC-SCNC: 6 MMOL/L — SIGNIFICANT CHANGE UP (ref 5–17)
AST SERPL-CCNC: 16 U/L — SIGNIFICANT CHANGE UP (ref 15–37)
BASOPHILS # BLD AUTO: 0.04 K/UL — SIGNIFICANT CHANGE UP (ref 0–0.2)
BASOPHILS NFR BLD AUTO: 0.6 % — SIGNIFICANT CHANGE UP (ref 0–2)
BILIRUB SERPL-MCNC: 0.6 MG/DL — SIGNIFICANT CHANGE UP (ref 0.2–1.2)
BUN SERPL-MCNC: 23 MG/DL — SIGNIFICANT CHANGE UP (ref 7–23)
CALCIUM SERPL-MCNC: 8.5 MG/DL — SIGNIFICANT CHANGE UP (ref 8.5–10.1)
CHLORIDE SERPL-SCNC: 108 MMOL/L — SIGNIFICANT CHANGE UP (ref 96–108)
CO2 SERPL-SCNC: 24 MMOL/L — SIGNIFICANT CHANGE UP (ref 22–31)
CREAT SERPL-MCNC: 0.91 MG/DL — SIGNIFICANT CHANGE UP (ref 0.5–1.3)
EGFR: 87 ML/MIN/1.73M2 — SIGNIFICANT CHANGE UP
EOSINOPHIL # BLD AUTO: 0.13 K/UL — SIGNIFICANT CHANGE UP (ref 0–0.5)
EOSINOPHIL NFR BLD AUTO: 1.9 % — SIGNIFICANT CHANGE UP (ref 0–6)
ESTIMATED AVERAGE GLUCOSE: 146 MG/DL — HIGH (ref 68–114)
GLUCOSE SERPL-MCNC: 117 MG/DL — HIGH (ref 70–99)
HCT VFR BLD CALC: 43.4 % — SIGNIFICANT CHANGE UP (ref 39–50)
HGB BLD-MCNC: 14.4 G/DL — SIGNIFICANT CHANGE UP (ref 13–17)
IMM GRANULOCYTES NFR BLD AUTO: 0.3 % — SIGNIFICANT CHANGE UP (ref 0–0.9)
LYMPHOCYTES # BLD AUTO: 0.68 K/UL — LOW (ref 1–3.3)
LYMPHOCYTES # BLD AUTO: 9.8 % — LOW (ref 13–44)
MCHC RBC-ENTMCNC: 28.9 PG — SIGNIFICANT CHANGE UP (ref 27–34)
MCHC RBC-ENTMCNC: 33.2 GM/DL — SIGNIFICANT CHANGE UP (ref 32–36)
MCV RBC AUTO: 87.1 FL — SIGNIFICANT CHANGE UP (ref 80–100)
MONOCYTES # BLD AUTO: 0.62 K/UL — SIGNIFICANT CHANGE UP (ref 0–0.9)
MONOCYTES NFR BLD AUTO: 8.9 % — SIGNIFICANT CHANGE UP (ref 2–14)
NEUTROPHILS # BLD AUTO: 5.47 K/UL — SIGNIFICANT CHANGE UP (ref 1.8–7.4)
NEUTROPHILS NFR BLD AUTO: 78.5 % — HIGH (ref 43–77)
NRBC # BLD: 0 /100 WBCS — SIGNIFICANT CHANGE UP (ref 0–0)
PLATELET # BLD AUTO: 173 K/UL — SIGNIFICANT CHANGE UP (ref 150–400)
POTASSIUM SERPL-MCNC: 3.1 MMOL/L — LOW (ref 3.5–5.3)
POTASSIUM SERPL-SCNC: 3.1 MMOL/L — LOW (ref 3.5–5.3)
PROT SERPL-MCNC: 6.4 G/DL — SIGNIFICANT CHANGE UP (ref 6–8.3)
RBC # BLD: 4.98 M/UL — SIGNIFICANT CHANGE UP (ref 4.2–5.8)
RBC # FLD: 15.1 % — HIGH (ref 10.3–14.5)
SODIUM SERPL-SCNC: 138 MMOL/L — SIGNIFICANT CHANGE UP (ref 135–145)
WBC # BLD: 6.96 K/UL — SIGNIFICANT CHANGE UP (ref 3.8–10.5)
WBC # FLD AUTO: 6.96 K/UL — SIGNIFICANT CHANGE UP (ref 3.8–10.5)

## 2024-01-28 PROCEDURE — 99222 1ST HOSP IP/OBS MODERATE 55: CPT

## 2024-01-28 PROCEDURE — 99233 SBSQ HOSP IP/OBS HIGH 50: CPT

## 2024-01-28 RX ORDER — POTASSIUM CHLORIDE 20 MEQ
40 PACKET (EA) ORAL ONCE
Refills: 0 | Status: COMPLETED | OUTPATIENT
Start: 2024-01-28 | End: 2024-01-28

## 2024-01-28 RX ORDER — POTASSIUM CHLORIDE 20 MEQ
20 PACKET (EA) ORAL ONCE
Refills: 0 | Status: COMPLETED | OUTPATIENT
Start: 2024-01-28 | End: 2024-01-28

## 2024-01-28 RX ADMIN — FAMOTIDINE 20 MILLIGRAM(S): 10 INJECTION INTRAVENOUS at 18:10

## 2024-01-28 RX ADMIN — Medication 20 MILLIEQUIVALENT(S): at 18:10

## 2024-01-28 RX ADMIN — BUMETANIDE 6 MILLIGRAM(S): 0.25 INJECTION INTRAMUSCULAR; INTRAVENOUS at 10:57

## 2024-01-28 RX ADMIN — ATORVASTATIN CALCIUM 40 MILLIGRAM(S): 80 TABLET, FILM COATED ORAL at 21:51

## 2024-01-28 RX ADMIN — Medication 650 MILLIGRAM(S): at 06:15

## 2024-01-28 RX ADMIN — Medication 40 MILLIEQUIVALENT(S): at 12:12

## 2024-01-28 RX ADMIN — HEPARIN SODIUM 5000 UNIT(S): 5000 INJECTION INTRAVENOUS; SUBCUTANEOUS at 05:52

## 2024-01-28 RX ADMIN — FAMOTIDINE 20 MILLIGRAM(S): 10 INJECTION INTRAVENOUS at 05:53

## 2024-01-28 RX ADMIN — Medication 650 MILLIGRAM(S): at 07:01

## 2024-01-28 RX ADMIN — BUMETANIDE 4 MILLIGRAM(S): 0.25 INJECTION INTRAMUSCULAR; INTRAVENOUS at 21:51

## 2024-01-28 RX ADMIN — HEPARIN SODIUM 5000 UNIT(S): 5000 INJECTION INTRAVENOUS; SUBCUTANEOUS at 21:50

## 2024-01-28 RX ADMIN — Medication 25 MILLIGRAM(S): at 05:53

## 2024-01-28 RX ADMIN — Medication 175 MICROGRAM(S): at 05:53

## 2024-01-28 RX ADMIN — TRAMADOL HYDROCHLORIDE 50 MILLIGRAM(S): 50 TABLET ORAL at 12:55

## 2024-01-28 RX ADMIN — BUDESONIDE AND FORMOTEROL FUMARATE DIHYDRATE 2 PUFF(S): 160; 4.5 AEROSOL RESPIRATORY (INHALATION) at 06:15

## 2024-01-28 RX ADMIN — HEPARIN SODIUM 5000 UNIT(S): 5000 INJECTION INTRAVENOUS; SUBCUTANEOUS at 14:05

## 2024-01-28 RX ADMIN — TRAMADOL HYDROCHLORIDE 50 MILLIGRAM(S): 50 TABLET ORAL at 13:55

## 2024-01-28 NOTE — CONSULT NOTE ADULT - SUBJECTIVE AND OBJECTIVE BOX
[facility] - Leyla Garcia, Cathie, Ted, Юлия Woodruff  Office Number: [phone]    Initial Consult Note    CHIEF COMPLAINT: Patient is a 78y old  Male who presents with a chief complaint of fall (28 Jan 2024 13:11)      HPI:  79yo M PMH chronic atrial fibrillation on Eliquis (s/p ablation 2021, CardioMEMS), CHF, COPD, DM2 on Mounjaro, Jardiance and novolog sliding scale, HTN, GERD presents to [facility] ED w/ a c/o of L hip pain. Pt states that he fell on1/24/2024 Wednesday but was able to walk after the fall. Patient describes he fell backwards while attempting to hold the door and landed on his left hip. Denies headstrike, loss of consciousness. Patient did not feel well enough to go to work on Thursday. On Friday, patient  reports he could not put any weight on his L leg. Pt states that he fell again on Friday 1/26/2024, landing on his rear and since then patient has been able to stand but with difficulty and has not been able to walk. Patient saw Dr. Prado in the office n Friday 1/24 and was instructed to come to ED today. Denies any numbness or tingling. Denies having any other pain elsewhere. Previous R shoulder TSA. Patient was BIBEMS to [facility] hospital. At rest, patient denies pain. Admits to pain solely with movement and weight bearing.     ED Course:   Vitals: BP: 144/79 --> 137/85, HR: 83, Temp: 98.1F, RR: 16, SpO2: 97% on RA  Labs: H/H 15.2/45.7 within normal; K low 3.4, BUN elevated 33, Creatinine elevated 1.4  UA: glucose >1000, negative nitrites, negative LE, negative ketones, trace protein, negative blood  CT pelvis:   Osseous: Acute minimally displaced transverse fracture through the base   of left greater trochanter. No convincing CT evidence for   intertrochanteric extension. Moderate bilateral hip arthrosis without   significant effusions. Sacroiliac joints and pubic symphysis are   maintained. Advanced lower lumbar discogenic spondylosis, otherwise   incompletely evaluated. No aggressive osseous neoplasm.  Soft tissues: No sizable hyperattenuating hematoma or drainable   encapsulated fluid collection. No pelvic free fluid.    EKG: Sinus rhythm with 1st degree AV block with occasional premature ventricular complexes, VR 79  Received in the ED   NS bolus 1L x1, Ofirmev x1 (27 Jan 2024 18:06)      PAST MEDICAL & SURGICAL HISTORY:  HTN (hypertension)      DM2 (diabetes mellitus, type 2)      Chronic atrial fibrillation      Congestive heart disease      COPD (chronic obstructive pulmonary disease)      Hypothyroidism      History of prior ablation treatment      H/O rhinoplasty      H/O shoulder surgery      History of tonsillectomy      History of appendectomy          SOCIAL HISTORY: former smoker, no ethanol, or drug abuse.    FAMILY HISTORY:  No pertinent family history in first degree relatives      No family history of acute MI or sudden cardiac death.    MEDICATIONS  (STANDING):  atorvastatin 40 milliGRAM(s) Oral at bedtime  budesonide  80 MICROgram(s)/formoterol 4.5 MICROgram(s) Inhaler 2 Puff(s) Inhalation two times a day  buMETAnide 6 milliGRAM(s) Oral daily  buMETAnide 4 milliGRAM(s) Oral at bedtime  dextrose 5%. 1000 milliLiter(s) (50 mL/Hr) IV Continuous <Continuous>  dextrose 5%. 1000 milliLiter(s) (100 mL/Hr) IV Continuous <Continuous>  dextrose 50% Injectable 25 Gram(s) IV Push once  dextrose 50% Injectable 12.5 Gram(s) IV Push once  dextrose 50% Injectable 25 Gram(s) IV Push once  famotidine    Tablet 20 milliGRAM(s) Oral two times a day  finerenone (Kerendia) 20mg 1 Tablet(s) 1 Tablet(s) Oral daily  glucagon  Injectable 1 milliGRAM(s) IntraMuscular once  heparin   Injectable 5000 Unit(s) SubCutaneous every 8 hours  insulin lispro (ADMELOG) corrective regimen sliding scale   SubCutaneous three times a day before meals  insulin lispro (ADMELOG) corrective regimen sliding scale   SubCutaneous at bedtime  levothyroxine 175 MICROGram(s) Oral daily  metoprolol succinate ER 25 milliGRAM(s) Oral daily  tiotropium 2.5 MICROgram(s) Inhaler 2 Puff(s) Inhalation daily    MEDICATIONS  (PRN):  acetaminophen     Tablet .. 650 milliGRAM(s) Oral every 6 hours PRN Temp greater or equal to 38C (100.4F), Mild Pain (1 - 3)  dextrose Oral Gel 15 Gram(s) Oral once PRN Blood Glucose LESS THAN 70 milliGRAM(s)/deciliter  melatonin 3 milliGRAM(s) Oral at bedtime PRN Insomnia  traMADol 25 milliGRAM(s) Oral every 6 hours PRN Moderate Pain (4 - 6)  traMADol 50 milliGRAM(s) Oral every 6 hours PRN Severe Pain (7 - 10)      Allergies    No Known Allergies    Intolerances        REVIEW OF SYSTEMS:    CONSTITUTIONAL: No weakness, fevers or chills  EYES/ENT: No visual changes;  No vertigo or throat pain   NECK: No pain or stiffness  RESPIRATORY: No cough, wheezing, hemoptysis; No shortness of breath  CARDIOVASCULAR: No chest pain or palpitations  GASTROINTESTINAL: No abdominal pain. No nausea, vomiting, or hematemesis; No diarrhea or constipation. No melena or hematochezia.  GENITOURINARY: No dysuria, frequency or hematuria  NEUROLOGICAL: No numbness or weakness  SKIN: No itching or rash  All other review of systems is negative unless indicated above    VITAL SIGNS:   Vital Signs Last 24 Hrs  T(C): 36.5 (28 Jan 2024 12:27), Max: 36.7 (27 Jan 2024 14:19)  T(F): 97.7 (28 Jan 2024 12:27), Max: 98.1 (27 Jan 2024 14:19)  HR: 77 (28 Jan 2024 12:27) (73 - 86)  BP: 148/84 (28 Jan 2024 12:27) (136/73 - 152/83)  BP(mean): --  RR: 18 (28 Jan 2024 12:27) (16 - 18)  SpO2: 93% (28 Jan 2024 12:27) (93% - 97%)    Parameters below as of 28 Jan 2024 12:27  Patient On (Oxygen Delivery Method): room air        I&O's Summary    27 Jan 2024 07:01  -  28 Jan 2024 07:00  --------------------------------------------------------  IN: 240 mL / OUT: 500 mL / NET: -260 mL    28 Jan 2024 07:01  -  28 Jan 2024 13:49  --------------------------------------------------------  IN: 0 mL / OUT: 400 mL / NET: -400 mL        On Exam:    Constitutional: NAD, alert and oriented x 3  Lungs:  Non-labored, breath sounds are clear bilaterally, No wheezing, rales or rhonchi  Cardiovascular: RRR.  S1 and S2 positive.  No murmurs, rubs, gallops or clicks  Gastrointestinal: Bowel Sounds present, soft, nontender.   Lymph: mild peripheral edema with chronic venous change. No cervical lymphadenopathy.  Neurological: Alert, no focal deficits  Skin: No rashes or ulcers   Psych:  Mood & affect appropriate.    LABS: All Labs Reviewed:                        14.4   6.96  )-----------( 173      ( 28 Jan 2024 05:20 )             43.4                         15.2   9.59  )-----------( 200      ( 27 Jan 2024 15:00 )             45.7     28 Jan 2024 05:20    138    |  108    |  23     ----------------------------<  117    3.1     |  24     |  0.91   27 Jan 2024 15:00    137    |  104    |  33     ----------------------------<  161    3.4     |  28     |  1.40     Ca    8.5        28 Jan 2024 05:20  Ca    9.3        27 Jan 2024 15:00    TPro  6.4    /  Alb  2.9    /  TBili  0.6    /  DBili  x      /  AST  16     /  ALT  22     /  AlkPhos  104    28 Jan 2024 05:20  TPro  7.1    /  Alb  3.2    /  TBili  0.5    /  DBili  x      /  AST  16     /  ALT  24     /  AlkPhos  112    27 Jan 2024 15:00    PT/INR - ( 27 Jan 2024 15:00 )   PT: 11.9 sec;   INR: 1.02 ratio         PTT - ( 27 Jan 2024 15:00 )  PTT:33.0 sec      Blood Culture:         RADIOLOGY:    EKG: sr 1st degree, rbbb

## 2024-01-28 NOTE — PROGRESS NOTE ADULT - SUBJECTIVE AND OBJECTIVE BOX
Patient is a 78y old  Male who presents with a chief complaint of fall (28 Jan 2024 07:46)      INTERVAL History of Present Illness/OVERNIGHT EVENTS: family at bedside  mechanical fall  await MRI hip    MEDICATIONS  (STANDING):  atorvastatin 40 milliGRAM(s) Oral at bedtime  budesonide  80 MICROgram(s)/formoterol 4.5 MICROgram(s) Inhaler 2 Puff(s) Inhalation two times a day  buMETAnide 6 milliGRAM(s) Oral daily  buMETAnide 4 milliGRAM(s) Oral at bedtime  dextrose 5%. 1000 milliLiter(s) (50 mL/Hr) IV Continuous <Continuous>  dextrose 5%. 1000 milliLiter(s) (100 mL/Hr) IV Continuous <Continuous>  dextrose 50% Injectable 25 Gram(s) IV Push once  dextrose 50% Injectable 25 Gram(s) IV Push once  dextrose 50% Injectable 12.5 Gram(s) IV Push once  famotidine    Tablet 20 milliGRAM(s) Oral two times a day  Finerenone (Kerendia) 20 mg tablet 1 Tablet(s)   Oral daily  glucagon  Injectable 1 milliGRAM(s) IntraMuscular once  heparin   Injectable 5000 Unit(s) SubCutaneous every 8 hours  insulin lispro (ADMELOG) corrective regimen sliding scale   SubCutaneous three times a day before meals  insulin lispro (ADMELOG) corrective regimen sliding scale   SubCutaneous at bedtime  levothyroxine 175 MICROGram(s) Oral daily  metoprolol succinate ER 25 milliGRAM(s) Oral daily  tiotropium 2.5 MICROgram(s) Inhaler 2 Puff(s) Inhalation daily    MEDICATIONS  (PRN):  acetaminophen     Tablet .. 650 milliGRAM(s) Oral every 6 hours PRN Temp greater or equal to 38C (100.4F), Mild Pain (1 - 3)  dextrose Oral Gel 15 Gram(s) Oral once PRN Blood Glucose LESS THAN 70 milliGRAM(s)/deciliter  melatonin 3 milliGRAM(s) Oral at bedtime PRN Insomnia  traMADol 25 milliGRAM(s) Oral every 6 hours PRN Moderate Pain (4 - 6)  traMADol 50 milliGRAM(s) Oral every 6 hours PRN Severe Pain (7 - 10)      Allergies    No Known Allergies    Intolerances        REVIEW OF SYSTEMS:  Other systems currently negative unless otherwise specified above.    Vital Signs Last 24 Hrs  T(C): 36.5 (28 Jan 2024 12:27), Max: 36.7 (27 Jan 2024 14:19)  T(F): 97.7 (28 Jan 2024 12:27), Max: 98.1 (27 Jan 2024 14:19)  HR: 77 (28 Jan 2024 12:27) (73 - 86)  BP: 148/84 (28 Jan 2024 12:27) (136/73 - 152/83)  BP(mean): --  RR: 18 (28 Jan 2024 12:27) (16 - 18)  SpO2: 93% (28 Jan 2024 12:27) (93% - 97%)    Parameters below as of 28 Jan 2024 12:27  Patient On (Oxygen Delivery Method): room air        Height (cm): 180.3 (01-27 @ 14:19)  Weight (kg): 106.1 (01-27 @ 14:19)  BMI (kg/m2): 32.6 (01-27 @ 14:19)  BSA (m2): 2.25 (01-27 @ 14:19)    PHYSICAL EXAM:  GENERAL: No apparent distress, appears stated age  EYES: Conjunctiva and sclera clear, no discharge  ENMT: Moist mucous membranes, no nasal discharge  CHEST/LUNG: Clear to auscultation bilaterally, no wheeze or rales  HEART: Regular rhythm, no rubs or gallops  ABDOMEN: Soft, Nontender, Nondistended  EXTREMITIES:  Chronic venous stasis dermatitis      LABS:                        14.4   6.96  )-----------( 173      ( 28 Jan 2024 05:20 )             43.4     28 Jan 2024 05:20    138    |  108    |  23     ----------------------------<  117    3.1     |  24     |  0.91     Ca    8.5        28 Jan 2024 05:20    TPro  6.4    /  Alb  2.9    /  TBili  0.6    /  DBili  x      /  AST  16     /  ALT  22     /  AlkPhos  104    28 Jan 2024 05:20    PT/INR - ( 27 Jan 2024 15:00 )   PT: 11.9 sec;   INR: 1.02 ratio         PTT - ( 27 Jan 2024 15:00 )  PTT:33.0 sec  Urinalysis Basic - ( 28 Jan 2024 05:20 )    Color: x / Appearance: x / SG: x / pH: x  Gluc: 117 mg/dL / Ketone: x  / Bili: x / Urobili: x   Blood: x / Protein: x / Nitrite: x   Leuk Esterase: x / RBC: x / WBC x   Sq Epi: x / Non Sq Epi: x / Bacteria: x      CAPILLARY BLOOD GLUCOSE      POCT Blood Glucose.: 142 mg/dL (28 Jan 2024 11:56)  POCT Blood Glucose.: 100 mg/dL (28 Jan 2024 07:44)  POCT Blood Glucose.: 100 mg/dL (27 Jan 2024 21:33)  POCT Blood Glucose.: 106 mg/dL (27 Jan 2024 16:56)    Height (cm): 180.3 (01-27 @ 14:19)  Weight (kg): 106.1 (01-27 @ 14:19)  BMI (kg/m2): 32.6 (01-27 @ 14:19)  BSA (m2): 2.25 (01-27 @ 14:19)    RADIOLOGY & ADDITIONAL TESTS:      Images reviewed personally    Consultant Notes Reviewed and Care Discussed with relevant Consultants.

## 2024-01-28 NOTE — CONSULT NOTE ADULT - ASSESSMENT
79yo M atrial fibrillation on Eliquis (s/p ablation 2021, CardioMEMS), diastolic CHF, COPD, DM2, HTN, GERD presents to [facility] ED with left hip pain after a fall.     - found to have a left sided hip fracture, and surgery is being considered, pending a MRI today  - does have cardiac history with an admission at [facility] in 2021 for volume overload requiring iv diuretics and cardiomems placement, followed by af with ablation    - no sign of acute ischemia. reports a cath without obstructive cad 5 years ago.  - cont with statin    - history of HFpef with cardiac mri showing ef 58% back in 2021  - has cardiomems without issue as of late  - does require high dose diuretics, and has been on bumex 6 mg in am, and 4 in sanju  - volume status acceptable and can continue home dose    - history of af s/p ablation in 2021  - in sr today  - cont bb  - ac on hold in preparation for possible surgery    - no cardiac contraindication to proceeding to OR if deemed necessary. Routine cardiac monitoring is recommended.  - will follow with you

## 2024-01-28 NOTE — PROGRESS NOTE ADULT - SUBJECTIVE AND OBJECTIVE BOX
Patient seen and examined at bedside. No acute complaints at this time. Pain well controlled. Denies chest pain, shortness of breath, nausea or vomiting.     PE:  Vital Signs Last 24 Hrs  T(C): 36.3 (01-28-24 @ 05:21), Max: 36.7 (01-27-24 @ 14:19)  T(F): 97.3 (01-28-24 @ 05:21), Max: 98.1 (01-27-24 @ 14:19)  HR: 84 (01-28-24 @ 05:21) (73 - 86)  BP: 149/88 (01-28-24 @ 05:21) (136/73 - 152/83)  BP(mean): --  RR: 18 (01-28-24 @ 05:21) (16 - 18)  SpO2: 93% (01-28-24 @ 05:21) (93% - 97%)    Gen: NAD, Resting comfortably  LLE:  Lower extremity wounds, 2/2 lower extremity vascular issues  Skin intact at hip, no erythema or ecchymosis  Able to SLR  Mild TTP by hip, nowhere else along RLE  Motor: + EHL/FHL/TA/GSC  +SILT: SPN/DPN/Clara/Saph/Tib  + DP  Compartments soft and compressible  No calf tenderness  Pain on axial load and log roll      A/P:   78yMale with L GT fx, without evidence of IT extension    Nonoperative treatment with WBAT and PT daily discussed with patient, who is agreeable  FU MR JAZ Hip, ordered to assess for IT extension  PT/OT Daily  WBAT given low suspicion of IT extension  Please hold all SGLT2 inhibitors for diabetes, given that it is a contraindication to surgery  OK to continue with chemical DVT ppx at this time, no contraindications  Analgesia   Ice hip as tolerated  Medical recommendations appreciated   Discussed with Dr. Prado, who is in agreement with above plan

## 2024-01-28 NOTE — CARE COORDINATION ASSESSMENT. - NSCAREPROVIDERS_GEN_ALL_CORE_FT
CARE PROVIDERS:  Accepting Physician: Mio Balderas  Administration: Oj Medel  Admitting: Mio Balderas  Attending: Mio Balderas  Consultant: Romelia Tarango  Consultant: Montez Woodruff  Consultant: Abner Lopez  Covering Team: Zamzam Harley ED ACP: Shweta Morrison ED Attending: Viraj Harris ED Nurse: Jose R Stevens  Nurse: Lupe Briones  Occupational Therapy: Susan Soto  Ordered: Doctor, Unknown  Ordered: ADM, User  Outpatient Provider: Hugo Prado  Override: Lin [address]  PCA/Nursing Assistant: Veronica Jane  Physical Therapy: Shirley Sexton  Primary Team: Carlos Bay  Primary Team: Jensen William  Registered Dietitian: Narcisa Lema  Respiratory Therapy: James Patel  : Tawnya Petersen

## 2024-01-28 NOTE — PROGRESS NOTE ADULT - ASSESSMENT
77yo M PMH chronic atrial fibrillation on Eliquis (s/p ablation 2021, CardioMEMS), CHF, COPD, DM2 on Mounjaro, Jardiance and novolog sliding scale, HTN, GERD presents to [facility] ED w/ a c/o of L hip pain. Admitted for MRI L hip planned for 1/29 and possible surgical intervention pending MR results.

## 2024-01-28 NOTE — CARE COORDINATION ASSESSMENT. - NSPASTMEDSURGHISTORY_GEN_ALL_CORE_FT
PAST MEDICAL & SURGICAL HISTORY:  Chronic atrial fibrillation      DM2 (diabetes mellitus, type 2)      HTN (hypertension)      History of prior ablation treatment      Hypothyroidism      COPD (chronic obstructive pulmonary disease)      Congestive heart disease      History of appendectomy      History of tonsillectomy      H/O shoulder surgery      H/O rhinoplasty

## 2024-01-28 NOTE — CARE COORDINATION ASSESSMENT. - NSDCPLANSERVICES_GEN_ALL_CORE
Met with patient by bedside for assessment. The patient is alert and oriented. He reports that he tripped and fell and has fractured his HIP. The patient reports that he is expecting to have to go to rehab. PT is to eval 1/29. The patient lives in a basement bedroom. He reports he needs rehab because ex ? wife and daughter or unable to help him if he falls again. He reports being in rehab before many years ago. HE was provided a list and choices were requested in preference order. He indicated he would prefer the [place] area. Educated him on role of  and dc planning process.

## 2024-01-29 ENCOUNTER — TRANSCRIPTION ENCOUNTER (OUTPATIENT)
Age: 79
End: 2024-01-29

## 2024-01-29 LAB
ALBUMIN SERPL ELPH-MCNC: 3.1 G/DL — LOW (ref 3.3–5)
ALP SERPL-CCNC: 111 U/L — SIGNIFICANT CHANGE UP (ref 40–120)
ALT FLD-CCNC: 24 U/L — SIGNIFICANT CHANGE UP (ref 12–78)
ANION GAP SERPL CALC-SCNC: 6 MMOL/L — SIGNIFICANT CHANGE UP (ref 5–17)
AST SERPL-CCNC: 19 U/L — SIGNIFICANT CHANGE UP (ref 15–37)
BILIRUB SERPL-MCNC: 0.7 MG/DL — SIGNIFICANT CHANGE UP (ref 0.2–1.2)
BUN SERPL-MCNC: 21 MG/DL — SIGNIFICANT CHANGE UP (ref 7–23)
CALCIUM SERPL-MCNC: 8.8 MG/DL — SIGNIFICANT CHANGE UP (ref 8.5–10.1)
CHLORIDE SERPL-SCNC: 106 MMOL/L — SIGNIFICANT CHANGE UP (ref 96–108)
CO2 SERPL-SCNC: 25 MMOL/L — SIGNIFICANT CHANGE UP (ref 22–31)
CREAT SERPL-MCNC: 1 MG/DL — SIGNIFICANT CHANGE UP (ref 0.5–1.3)
EGFR: 78 ML/MIN/1.73M2 — SIGNIFICANT CHANGE UP
GLUCOSE SERPL-MCNC: 122 MG/DL — HIGH (ref 70–99)
POTASSIUM SERPL-MCNC: 3.3 MMOL/L — LOW (ref 3.5–5.3)
POTASSIUM SERPL-SCNC: 3.3 MMOL/L — LOW (ref 3.5–5.3)
PROT SERPL-MCNC: 7 G/DL — SIGNIFICANT CHANGE UP (ref 6–8.3)
SODIUM SERPL-SCNC: 137 MMOL/L — SIGNIFICANT CHANGE UP (ref 135–145)

## 2024-01-29 PROCEDURE — 99233 SBSQ HOSP IP/OBS HIGH 50: CPT

## 2024-01-29 PROCEDURE — 73721 MRI JNT OF LWR EXTRE W/O DYE: CPT | Mod: 26,LT

## 2024-01-29 PROCEDURE — 99232 SBSQ HOSP IP/OBS MODERATE 35: CPT

## 2024-01-29 RX ORDER — POTASSIUM CHLORIDE 20 MEQ
10 PACKET (EA) ORAL ONCE
Refills: 0 | Status: DISCONTINUED | OUTPATIENT
Start: 2024-01-29 | End: 2024-01-29

## 2024-01-29 RX ORDER — POTASSIUM CHLORIDE 20 MEQ
40 PACKET (EA) ORAL ONCE
Refills: 0 | Status: COMPLETED | OUTPATIENT
Start: 2024-01-29 | End: 2024-01-29

## 2024-01-29 RX ORDER — ALPRAZOLAM 0.25 MG
0.25 TABLET ORAL ONCE
Refills: 0 | Status: DISCONTINUED | OUTPATIENT
Start: 2024-01-29 | End: 2024-01-29

## 2024-01-29 RX ORDER — SODIUM CHLORIDE 9 MG/ML
1000 INJECTION, SOLUTION INTRAVENOUS
Refills: 0 | Status: DISCONTINUED | OUTPATIENT
Start: 2024-01-29 | End: 2024-01-30

## 2024-01-29 RX ORDER — POTASSIUM CHLORIDE 20 MEQ
10 PACKET (EA) ORAL ONCE
Refills: 0 | Status: COMPLETED | OUTPATIENT
Start: 2024-01-29 | End: 2024-01-29

## 2024-01-29 RX ORDER — POTASSIUM CHLORIDE 20 MEQ
40 PACKET (EA) ORAL ONCE
Refills: 0 | Status: DISCONTINUED | OUTPATIENT
Start: 2024-01-29 | End: 2024-01-29

## 2024-01-29 RX ADMIN — Medication 25 MILLIGRAM(S): at 05:56

## 2024-01-29 RX ADMIN — Medication 175 MICROGRAM(S): at 05:54

## 2024-01-29 RX ADMIN — FAMOTIDINE 20 MILLIGRAM(S): 10 INJECTION INTRAVENOUS at 18:20

## 2024-01-29 RX ADMIN — Medication 40 MILLIEQUIVALENT(S): at 20:05

## 2024-01-29 RX ADMIN — ATORVASTATIN CALCIUM 40 MILLIGRAM(S): 80 TABLET, FILM COATED ORAL at 21:52

## 2024-01-29 RX ADMIN — BUDESONIDE AND FORMOTEROL FUMARATE DIHYDRATE 2 PUFF(S): 160; 4.5 AEROSOL RESPIRATORY (INHALATION) at 18:23

## 2024-01-29 RX ADMIN — BUDESONIDE AND FORMOTEROL FUMARATE DIHYDRATE 2 PUFF(S): 160; 4.5 AEROSOL RESPIRATORY (INHALATION) at 05:56

## 2024-01-29 RX ADMIN — Medication 650 MILLIGRAM(S): at 17:00

## 2024-01-29 RX ADMIN — Medication 0.25 MILLIGRAM(S): at 14:09

## 2024-01-29 RX ADMIN — FAMOTIDINE 20 MILLIGRAM(S): 10 INJECTION INTRAVENOUS at 05:55

## 2024-01-29 RX ADMIN — Medication 650 MILLIGRAM(S): at 16:37

## 2024-01-29 RX ADMIN — BUMETANIDE 6 MILLIGRAM(S): 0.25 INJECTION INTRAMUSCULAR; INTRAVENOUS at 05:55

## 2024-01-29 RX ADMIN — HEPARIN SODIUM 5000 UNIT(S): 5000 INJECTION INTRAVENOUS; SUBCUTANEOUS at 21:52

## 2024-01-29 RX ADMIN — HEPARIN SODIUM 5000 UNIT(S): 5000 INJECTION INTRAVENOUS; SUBCUTANEOUS at 05:56

## 2024-01-29 RX ADMIN — BUMETANIDE 4 MILLIGRAM(S): 0.25 INJECTION INTRAMUSCULAR; INTRAVENOUS at 21:52

## 2024-01-29 NOTE — PROGRESS NOTE ADULT - SUBJECTIVE AND OBJECTIVE BOX
Patient is a 78y old  Male who presents with a chief complaint of fall (29 Jan 2024 08:47)      INTERVAL HPI/OVERNIGHT EVENTS: Pt seen and examined bedside, has no complaints. OOB to chair, gets pain on and off. had MRI Lt hip.        MEDICATIONS  (STANDING):  atorvastatin 40 milliGRAM(s) Oral at bedtime  budesonide  80 MICROgram(s)/formoterol 4.5 MICROgram(s) Inhaler 2 Puff(s) Inhalation two times a day  buMETAnide 6 milliGRAM(s) Oral daily  buMETAnide 4 milliGRAM(s) Oral at bedtime  dextrose 5%. 1000 milliLiter(s) (50 mL/Hr) IV Continuous <Continuous>  dextrose 5%. 1000 milliLiter(s) (100 mL/Hr) IV Continuous <Continuous>  dextrose 50% Injectable 25 Gram(s) IV Push once  dextrose 50% Injectable 12.5 Gram(s) IV Push once  dextrose 50% Injectable 25 Gram(s) IV Push once  famotidine    Tablet 20 milliGRAM(s) Oral two times a day  finerenone (Kerendia) 20mg 1 Tablet(s) 1 Tablet(s) Oral daily  glucagon  Injectable 1 milliGRAM(s) IntraMuscular once  heparin   Injectable 5000 Unit(s) SubCutaneous every 8 hours  insulin lispro (ADMELOG) corrective regimen sliding scale   SubCutaneous three times a day before meals  insulin lispro (ADMELOG) corrective regimen sliding scale   SubCutaneous at bedtime  levothyroxine 175 MICROGram(s) Oral daily  metoprolol succinate ER 25 milliGRAM(s) Oral daily  tiotropium 2.5 MICROgram(s) Inhaler 2 Puff(s) Inhalation daily    MEDICATIONS  (PRN):  acetaminophen     Tablet .. 650 milliGRAM(s) Oral every 6 hours PRN Temp greater or equal to 38C (100.4F), Mild Pain (1 - 3)  dextrose Oral Gel 15 Gram(s) Oral once PRN Blood Glucose LESS THAN 70 milliGRAM(s)/deciliter  melatonin 3 milliGRAM(s) Oral at bedtime PRN Insomnia  traMADol 50 milliGRAM(s) Oral every 6 hours PRN Severe Pain (7 - 10)  traMADol 25 milliGRAM(s) Oral every 6 hours PRN Moderate Pain (4 - 6)      Allergies    No Known Allergies    Intolerances        REVIEW OF SYSTEMS:  CONSTITUTIONAL: No fever or chills  HEENT:  No headache, no sore throat  RESPIRATORY: No cough, wheezing, or shortness of breath  CARDIOVASCULAR: No chest pain, palpitations, or leg swelling  GASTROINTESTINAL: No abd pain, nausea, vomiting, or diarrhea  GENITOURINARY: No dysuria, frequency, or hematuria  NEUROLOGICAL: no focal weakness or dizziness  MUSCULOSKELETAL: + Lt hip pain    Vital Signs Last 24 Hrs  T(C): 36.2 (29 Jan 2024 12:59), Max: 36.8 (28 Jan 2024 21:30)  T(F): 97.2 (29 Jan 2024 12:59), Max: 98.3 (28 Jan 2024 21:30)  HR: 86 (29 Jan 2024 12:59) (83 - 86)  BP: 137/83 (29 Jan 2024 12:59) (134/76 - 165/90)  BP(mean): --  RR: 18 (29 Jan 2024 12:59) (18 - 18)  SpO2: 93% (29 Jan 2024 12:59) (91% - 93%)    Parameters below as of 29 Jan 2024 12:59  Patient On (Oxygen Delivery Method): room air        PHYSICAL EXAM:  GENERAL: NAD  HEENT:  EOMI, moist mucous membranes  CHEST/LUNG:  CTA b/l, no rales, wheezes, or rhonchi  HEART:  RRR, S1, S2  ABDOMEN:  BS+, soft, nontender, nondistended  EXTREMITIES: no edema, cyanosis, or calf tenderness  NERVOUS SYSTEM: AA&Ox3    LABS:    CBC Full  -  ( 28 Jan 2024 05:20 )  WBC Count : 6.96 K/uL  Hemoglobin : 14.4 g/dL  Hematocrit : 43.4 %  Platelet Count - Automated : 173 K/uL  Mean Cell Volume : 87.1 fl  Mean Cell Hemoglobin : 28.9 pg  Mean Cell Hemoglobin Concentration : 33.2 gm/dL  Auto Neutrophil # : 5.47 K/uL  Auto Lymphocyte # : 0.68 K/uL  Auto Monocyte # : 0.62 K/uL  Auto Eosinophil # : 0.13 K/uL  Auto Basophil # : 0.04 K/uL  Auto Neutrophil % : 78.5 %  Auto Lymphocyte % : 9.8 %  Auto Monocyte % : 8.9 %  Auto Eosinophil % : 1.9 %  Auto Basophil % : 0.6 %    29 Jan 2024 07:26    137    |  106    |  21     ----------------------------<  122    3.3     |  25     |  1.00     Ca    8.8        29 Jan 2024 07:26  Phos  2.9       29 Jan 2024 07:26  Mg     2.1       29 Jan 2024 07:26    TPro  7.0    /  Alb  3.1    /  TBili  0.7    /  DBili  x      /  AST  19     /  ALT  24     /  AlkPhos  111    29 Jan 2024 07:26      Urinalysis Basic - ( 29 Jan 2024 07:26 )    Color: x / Appearance: x / SG: x / pH: x  Gluc: 122 mg/dL / Ketone: x  / Bili: x / Urobili: x   Blood: x / Protein: x / Nitrite: x   Leuk Esterase: x / RBC: x / WBC x   Sq Epi: x / Non Sq Epi: x / Bacteria: x      CAPILLARY BLOOD GLUCOSE      POCT Blood Glucose.: 135 mg/dL (29 Jan 2024 11:48)  POCT Blood Glucose.: 121 mg/dL (29 Jan 2024 07:50)  POCT Blood Glucose.: 120 mg/dL (28 Jan 2024 21:42)  POCT Blood Glucose.: 132 mg/dL (28 Jan 2024 16:52)          RADIOLOGY & ADDITIONAL TESTS:    Personally reviewed.     Consultant(s) Notes Reviewed:  [x] YES  [ ] NO    Care Discussed with [x] Consultants  [x] Patient  [ ] Family  [ ]      [ ] Other; RN  DVT ppx

## 2024-01-29 NOTE — PROGRESS NOTE ADULT - ASSESSMENT
77yo M atrial fibrillation on Eliquis (s/p ablation 2021, CardioMEMS), diastolic CHF, COPD, DM2, HTN, GERD presents to [facility] ED with left hip pain after a fall.     Sp fall found with fracture   - found to have a left sided hip fracture, and surgery is being considered, pending a MRI today  - does have cardiac history with an admission at [facility] in 2021 for volume overload requiring iv diuretics and cardiomems placement, followed by af with ablation, followed by Dr Balderas [place] he will be following with Dr Fontenot     -EKG as above SR with RBBB   - no sign of acute ischemia  -reports a cath without obstructive cad 5 years ago.  - cont with statin    - history of HFpef with cardiac mri showing ef 58% back in 2021  - has cardiomems without any recent issues   - does require high dose diuretics, and has been on bumex 6 mg in am, and 4 in pm  - reports his legs are at baseline, on room air no orthopnea   -strict intake and output     - history of af s/p ablation in 2021 on home eliquis now on hold for possible surgical intervention   - tele revealing Sr no events overnight   - cont bb  - ac on hold in preparation for possible surgery    - no cardiac contraindication to proceeding to OR if deemed necessary. Routine cardiac monitoring is recommended.  - will follow with you   77yo M atrial fibrillation on Eliquis (s/p ablation 2021, CardioMEMS), diastolic CHF, COPD, DM2, HTN, GERD presents to [facility] ED with left hip pain after a fall.     Sp fall found with fracture   - found to have a left sided hip fracture, and surgery is being considered, pending a MRI today- ok to proceed with MRI   - does have cardiac history with an admission at [facility] in 2021 for volume overload requiring iv diuretics and cardiomems placement, followed by af with ablation, followed by Dr Balderas who now [place] he will be following with Dr Fontenot     -EKG as above SR with RBBB   - no sign of acute ischemia  -reports a cath without obstructive cad 5 years ago.  - cont with statin    - history of HFpef with cardiac mri showing ef 58% back in 2021  - has cardiomems without any recent issues   - does require high dose diuretics, and has been on bumex 6 mg in am, and 4 in pm  - reports his legs are at baseline, on room air no orthopnea   -strict intake and output     - history of af s/p ablation in 2021 on home eliquis now on hold for possible surgical intervention   - tele revealing Sr no events overnight   - cont bb  - ac on hold in preparation for possible surgery    - no cardiac contraindication to proceeding to OR if deemed necessary. Routine cardiac monitoring is recommended.  - will follow with you

## 2024-01-29 NOTE — PROGRESS NOTE ADULT - ASSESSMENT
79yo M PMH chronic atrial fibrillation on Eliquis (s/p ablation 2021, CardioMEMS), CHF, COPD, DM2 on Mounjaro, Jardiance and novolog sliding scale, HTN, GERD presents to [facility] ED w/ a c/o of L hip pain. Admitted for MRI L hip planned for 1/29 and possible surgical intervention pending MR results.

## 2024-01-29 NOTE — SOCIAL WORK PROGRESS NOTE - NSSWPROGRESSNOTE_GEN_ALL_CORE
Pt requesting to speak with SW in order to discuss dc plan, MRI pending at this time, PTE on hold. Pt requesting MOI at this time. Choices obtained in order of preference: [facility], [facility], and [facility]. Will continue to follow up with additional choices as needed. [facility] requested. SW to remain available and to continue to follow up.  Pt requesting to speak with SW in order to discuss dc plan, MRI pending at this time, PTE on hold. Pt requesting MOI at this time. Choices obtained in order of preference: [facility], [facility], and [facility]. Will continue to follow up with additional choices as needed. SW to remain available and to continue to follow up.

## 2024-01-29 NOTE — CHART NOTE - NSCHARTNOTEFT_GEN_A_CORE
Patient's MR results of L Hip came back which demonstrated IT extension of the GT Fx. Patient is now scheduled for a L Hip IMN with Dr. Prado tomorrow on 1/30/2024.     Plan:   OR tomorrow with Dr. Prado  NPO after midnight, IVF while NPO  Please document medical and cardiac optimization  Pain control  NWB LLE  Plan discussed with Dr. Prado who agrees with above.

## 2024-01-29 NOTE — PROGRESS NOTE ADULT - SUBJECTIVE AND OBJECTIVE BOX
[facility] -- Leyla Garcia Pannella, Patel, Savella Goodger, Cohen  Office # [phone]      Follow Up:  Cardiac optimization    Subjective/Observations:     No events overnight resting comfortably in bed.  No complaints of chest pain, dyspnea, or palpitations reported. No signs of orthopnea or PND.  sitting in chair on room air   reports chronic LE edema   REVIEW OF SYSTEMS: All other review of systems is negative unless indicated above    PAST MEDICAL & SURGICAL HISTORY:  HTN (hypertension)      DM2 (diabetes mellitus, type 2)      Chronic atrial fibrillation      Congestive heart disease      COPD (chronic obstructive pulmonary disease)      Hypothyroidism      History of prior ablation treatment      H/O rhinoplasty      H/O shoulder surgery      History of tonsillectomy      History of appendectomy          MEDICATIONS  (STANDING):  atorvastatin 40 milliGRAM(s) Oral at bedtime  budesonide  80 MICROgram(s)/formoterol 4.5 MICROgram(s) Inhaler 2 Puff(s) Inhalation two times a day  buMETAnide 6 milliGRAM(s) Oral daily  buMETAnide 4 milliGRAM(s) Oral at bedtime  dextrose 5%. 1000 milliLiter(s) (50 mL/Hr) IV Continuous <Continuous>  dextrose 5%. 1000 milliLiter(s) (100 mL/Hr) IV Continuous <Continuous>  dextrose 50% Injectable 25 Gram(s) IV Push once  dextrose 50% Injectable 12.5 Gram(s) IV Push once  dextrose 50% Injectable 25 Gram(s) IV Push once  famotidine    Tablet 20 milliGRAM(s) Oral two times a day  finerenone (Kerendia) 20mg 1 Tablet(s) 1 Tablet(s) Oral daily  glucagon  Injectable 1 milliGRAM(s) IntraMuscular once  heparin   Injectable 5000 Unit(s) SubCutaneous every 8 hours  insulin lispro (ADMELOG) corrective regimen sliding scale   SubCutaneous three times a day before meals  insulin lispro (ADMELOG) corrective regimen sliding scale   SubCutaneous at bedtime  levothyroxine 175 MICROGram(s) Oral daily  metoprolol succinate ER 25 milliGRAM(s) Oral daily  tiotropium 2.5 MICROgram(s) Inhaler 2 Puff(s) Inhalation daily    MEDICATIONS  (PRN):  acetaminophen     Tablet .. 650 milliGRAM(s) Oral every 6 hours PRN Temp greater or equal to 38C (100.4F), Mild Pain (1 - 3)  dextrose Oral Gel 15 Gram(s) Oral once PRN Blood Glucose LESS THAN 70 milliGRAM(s)/deciliter  melatonin 3 milliGRAM(s) Oral at bedtime PRN Insomnia  traMADol 50 milliGRAM(s) Oral every 6 hours PRN Severe Pain (7 - 10)  traMADol 25 milliGRAM(s) Oral every 6 hours PRN Moderate Pain (4 - 6)      Allergies    No Known Allergies    Intolerances        Vital Signs Last 24 Hrs  T(C): 36.4 (2024 05:51), Max: 36.8 (2024 21:30)  T(F): 97.6 (2024 05:51), Max: 98.3 (2024 21:30)  HR: 84 (2024 05:51) (77 - 84)  BP: 134/76 (2024 05:51) (134/76 - 165/90)  BP(mean): --  RR: 18 (2024 05:51) (18 - 18)  SpO2: 91% (2024 05:51) (91% - 93%)    Parameters below as of 2024 05:51  Patient On (Oxygen Delivery Method): room air        I&O's Summary    2024 07:01  -  2024 07:00  --------------------------------------------------------  IN: 240 mL / OUT: 1100 mL / NET: -860 mL          PHYSICAL EXAM:  TELE:   Constitutional: NAD, awake and alert, well-developed  HEENT: Moist Mucous Membranes, Anicteric  Pulmonary: Non-labored, breath sounds are clear bilaterally, No wheezing, crackles or rhonchi  Cardiovascular: Regular, S1 and S2 nl, No murmurs, rubs, gallops or clicks  Gastrointestinal: Bowel Sounds present, soft, nontender.   Lymph:+ le edema, chronic venous stasis changes   Psych:  Mood & affect appropriate    LABS: All Labs Reviewed:                        14.4   6.96  )-----------( 173      ( 2024 05:20 )             43.4                         15.2   9.59  )-----------( 200      ( 2024 15:00 )             45.7     2024 07:26    137    |  106    |  21     ----------------------------<  122    3.3     |  25     |  1.00   2024 05:20    138    |  108    |  23     ----------------------------<  117    3.1     |  24     |  0.91   2024 15:00    137    |  104    |  33     ----------------------------<  161    3.4     |  28     |  1.40     Ca    8.8        2024 07:26  Ca    8.5        2024 05:20  Ca    9.3        2024 15:00    TPro  7.0    /  Alb  3.1    /  TBili  0.7    /  DBili  x      /  AST  19     /  ALT  24     /  AlkPhos  111    2024 07:26  TPro  6.4    /  Alb  2.9    /  TBili  0.6    /  DBili  x      /  AST  16     /  ALT  22     /  AlkPhos  104    2024 05:20  TPro  7.1    /  Alb  3.2    /  TBili  0.5    /  DBili  x      /  AST  16     /  ALT  24     /  AlkPhos  112    2024 15:00    PT/INR - ( 2024 15:00 )   PT: 11.9 sec;   INR: 1.02 ratio         PTT - ( 2024 15:00 )  PTT:33.0 sec         EC Lead ECG:   Ventricular Rate 79 BPM    Atrial Rate 79 BPM    P-R Interval 288 ms    QRS Duration 164 ms    Q-T Interval 450 ms    QTC Calculation(Bazett) 516 ms    P Axis 65 degrees    R Axis -10 degrees    T Axis -11 degrees    Diagnosis Line Sinus rhythm with 1st degree AV block with occasional premature ventricular complexes  Right bundle branch block  Abnormal ECG  No previous ECGs available  Confirmed by enrique Woodruff ([id number]) on 2024 11:54:45 AM (24 @ 19:49)      ACC: [id number] EXAM:  US TTE W DOPPLER COMPLETE   ORDERED BY: NASIR TERESA     PROCEDURE DATE:  2023          INTERPRETATION:  Ordering Physician: NASIR PARKER BRII [id number]    Indication: Syncope    Technician: JOHN    Study Quality: Technically difficult  A complete echocardiographic study was performed utilizing standard   protocol including spectral and color Doppler in all echocardiographic   windows.    Height: 180 cm  Weight: 106 kg  BSA: 2.25 m2  Blood Pressure: 110/60 mmHg    MEASUREMENTS  IVS: 1.5 cm  PWT: 1.4 cm  LA: 4.2 cm  AO: 4.0 cm  LVIDd: 4.2 cm  LVIDs: 3.5 cm    LVEF: 60%  RVSP: 18 mmHg  RA Pressure: 3 mmHg    FINDINGS  Left Ventricle: The left ventricle is normal in size. Increased left   ventricular wall thickness. The left ventricular systolic function is not   well visualized but appears grossly normal with an estimated EF of 60%.  Right Ventricle: The right ventricle is dilated with normal function.  Left Atrium: The left atrium is dilated.  Right Atrium: The right atrium is dilated.  Mitral Valve: Mitral annular calcification. Trace mitral regurgitation.  Aortic Valve: The aortic valve is structurally normal. No aortic   regurgitation.  Tricuspid Valve: The tricuspid valve is structurally normal. Trace   tricuspid regurgitation. Estimated pulmonary artery systolic pressure is   18 mmHg.  Pulmonic Valve: The pulmonic valve is not well visualized.  Diastolic Function: Indeterminate.  Pericardium/Pleura: No pericardial effusion visualized.  Aorta: The aortic root is normal in size.    IMPRESSION:  1. Normal left ventricular systolic function.  2. Increased left ventricular wall thickness.  3. Dilated left atrium, right atrium, right ventricle.    --- End of Report --         [facility] -- Leyla Garcia Pannella, Patel, Savella Goodger, Cohen  Office # [phone]      Follow Up:  Cardiac optimization    Subjective/Observations:     No events overnight resting comfortably in bed.  No complaints of chest pain, dyspnea, or palpitations reported. No signs of orthopnea or PND.  sitting in chair on room air   reports chronic LE edema     REVIEW OF SYSTEMS: All other review of systems is negative unless indicated above    PAST MEDICAL & SURGICAL HISTORY:  HTN (hypertension)      DM2 (diabetes mellitus, type 2)      Chronic atrial fibrillation      Congestive heart disease      COPD (chronic obstructive pulmonary disease)      Hypothyroidism      History of prior ablation treatment      H/O rhinoplasty      H/O shoulder surgery      History of tonsillectomy      History of appendectomy          MEDICATIONS  (STANDING):  atorvastatin 40 milliGRAM(s) Oral at bedtime  budesonide  80 MICROgram(s)/formoterol 4.5 MICROgram(s) Inhaler 2 Puff(s) Inhalation two times a day  buMETAnide 6 milliGRAM(s) Oral daily  buMETAnide 4 milliGRAM(s) Oral at bedtime  dextrose 5%. 1000 milliLiter(s) (50 mL/Hr) IV Continuous <Continuous>  dextrose 5%. 1000 milliLiter(s) (100 mL/Hr) IV Continuous <Continuous>  dextrose 50% Injectable 25 Gram(s) IV Push once  dextrose 50% Injectable 12.5 Gram(s) IV Push once  dextrose 50% Injectable 25 Gram(s) IV Push once  famotidine    Tablet 20 milliGRAM(s) Oral two times a day  finerenone (Kerendia) 20mg 1 Tablet(s) 1 Tablet(s) Oral daily  glucagon  Injectable 1 milliGRAM(s) IntraMuscular once  heparin   Injectable 5000 Unit(s) SubCutaneous every 8 hours  insulin lispro (ADMELOG) corrective regimen sliding scale   SubCutaneous three times a day before meals  insulin lispro (ADMELOG) corrective regimen sliding scale   SubCutaneous at bedtime  levothyroxine 175 MICROGram(s) Oral daily  metoprolol succinate ER 25 milliGRAM(s) Oral daily  tiotropium 2.5 MICROgram(s) Inhaler 2 Puff(s) Inhalation daily    MEDICATIONS  (PRN):  acetaminophen     Tablet .. 650 milliGRAM(s) Oral every 6 hours PRN Temp greater or equal to 38C (100.4F), Mild Pain (1 - 3)  dextrose Oral Gel 15 Gram(s) Oral once PRN Blood Glucose LESS THAN 70 milliGRAM(s)/deciliter  melatonin 3 milliGRAM(s) Oral at bedtime PRN Insomnia  traMADol 50 milliGRAM(s) Oral every 6 hours PRN Severe Pain (7 - 10)  traMADol 25 milliGRAM(s) Oral every 6 hours PRN Moderate Pain (4 - 6)      Allergies    No Known Allergies    Intolerances        Vital Signs Last 24 Hrs  T(C): 36.4 (2024 05:51), Max: 36.8 (2024 21:30)  T(F): 97.6 (2024 05:51), Max: 98.3 (2024 21:30)  HR: 84 (2024 05:51) (77 - 84)  BP: 134/76 (2024 05:51) (134/76 - 165/90)  BP(mean): --  RR: 18 (2024 05:51) (18 - 18)  SpO2: 91% (2024 05:51) (91% - 93%)    Parameters below as of 2024 05:51  Patient On (Oxygen Delivery Method): room air        I&O's Summary    2024 07:01  -  2024 07:00  --------------------------------------------------------  IN: 240 mL / OUT: 1100 mL / NET: -860 mL          PHYSICAL EXAM:  TELE:   Constitutional: NAD, awake and alert, well-developed  HEENT: Moist Mucous Membranes, Anicteric  Pulmonary: Non-labored, breath sounds are clear bilaterally, No wheezing, crackles or rhonchi  Cardiovascular: Regular, S1 and S2 nl, No murmurs, rubs, gallops or clicks  Gastrointestinal: Bowel Sounds present, soft, nontender.   Lymph:+ le edema, chronic venous stasis changes   Psych:  Mood & affect appropriate    LABS: All Labs Reviewed:                        14.4   6.96  )-----------( 173      ( 2024 05:20 )             43.4                         15.2   9.59  )-----------( 200      ( 2024 15:00 )             45.7     2024 07:26    137    |  106    |  21     ----------------------------<  122    3.3     |  25     |  1.00   2024 05:20    138    |  108    |  23     ----------------------------<  117    3.1     |  24     |  0.91   2024 15:00    137    |  104    |  33     ----------------------------<  161    3.4     |  28     |  1.40     Ca    8.8        2024 07:26  Ca    8.5        2024 05:20  Ca    9.3        2024 15:00    TPro  7.0    /  Alb  3.1    /  TBili  0.7    /  DBili  x      /  AST  19     /  ALT  24     /  AlkPhos  111    2024 07:26  TPro  6.4    /  Alb  2.9    /  TBili  0.6    /  DBili  x      /  AST  16     /  ALT  22     /  AlkPhos  104    2024 05:20  TPro  7.1    /  Alb  3.2    /  TBili  0.5    /  DBili  x      /  AST  16     /  ALT  24     /  AlkPhos  112    2024 15:00    PT/INR - ( 2024 15:00 )   PT: 11.9 sec;   INR: 1.02 ratio         PTT - ( 2024 15:00 )  PTT:33.0 sec         EC Lead ECG:   Ventricular Rate 79 BPM    Atrial Rate 79 BPM    P-R Interval 288 ms    QRS Duration 164 ms    Q-T Interval 450 ms    QTC Calculation(Bazett) 516 ms    P Axis 65 degrees    R Axis -10 degrees    T Axis -11 degrees    Diagnosis Line Sinus rhythm with 1st degree AV block with occasional premature ventricular complexes  Right bundle branch block  Abnormal ECG  No previous ECGs available  Confirmed by enrique Woodruff ([id number]) on 2024 11:54:45 AM (24 @ 19:49)      ACC: [id number] EXAM:  US TTE W DOPPLER COMPLETE   ORDERED BY: NASIR TERESA     PROCEDURE DATE:  2023          INTERPRETATION:  Ordering Physician: NASIR PARKER BRII [id number]    Indication: Syncope    Technician: JOHN    Study Quality: Technically difficult  A complete echocardiographic study was performed utilizing standard   protocol including spectral and color Doppler in all echocardiographic   windows.    Height: 180 cm  Weight: 106 kg  BSA: 2.25 m2  Blood Pressure: 110/60 mmHg    MEASUREMENTS  IVS: 1.5 cm  PWT: 1.4 cm  LA: 4.2 cm  AO: 4.0 cm  LVIDd: 4.2 cm  LVIDs: 3.5 cm    LVEF: 60%  RVSP: 18 mmHg  RA Pressure: 3 mmHg    FINDINGS  Left Ventricle: The left ventricle is normal in size. Increased left   ventricular wall thickness. The left ventricular systolic function is not   well visualized but appears grossly normal with an estimated EF of 60%.  Right Ventricle: The right ventricle is dilated with normal function.  Left Atrium: The left atrium is dilated.  Right Atrium: The right atrium is dilated.  Mitral Valve: Mitral annular calcification. Trace mitral regurgitation.  Aortic Valve: The aortic valve is structurally normal. No aortic   regurgitation.  Tricuspid Valve: The tricuspid valve is structurally normal. Trace   tricuspid regurgitation. Estimated pulmonary artery systolic pressure is   18 mmHg.  Pulmonic Valve: The pulmonic valve is not well visualized.  Diastolic Function: Indeterminate.  Pericardium/Pleura: No pericardial effusion visualized.  Aorta: The aortic root is normal in size.    IMPRESSION:  1. Normal left ventricular systolic function.  2. Increased left ventricular wall thickness.  3. Dilated left atrium, right atrium, right ventricle.    --- End of Report --         [facility] -- Leyla Garcia Pannella, Patel, Savella Goodger, Cohen  Office # [phone]      Follow Up:  Cardiac optimization, HTN     Subjective/Observations:     No events overnight resting comfortably in bed.  No complaints of chest pain, dyspnea, or palpitations reported. No signs of orthopnea or PND.  sitting in chair on room air   reports chronic LE edema     REVIEW OF SYSTEMS: All other review of systems is negative unless indicated above    PAST MEDICAL & SURGICAL HISTORY:  HTN (hypertension)      DM2 (diabetes mellitus, type 2)      Chronic atrial fibrillation      Congestive heart disease      COPD (chronic obstructive pulmonary disease)      Hypothyroidism      History of prior ablation treatment      H/O rhinoplasty      H/O shoulder surgery      History of tonsillectomy      History of appendectomy          MEDICATIONS  (STANDING):  atorvastatin 40 milliGRAM(s) Oral at bedtime  budesonide  80 MICROgram(s)/formoterol 4.5 MICROgram(s) Inhaler 2 Puff(s) Inhalation two times a day  buMETAnide 6 milliGRAM(s) Oral daily  buMETAnide 4 milliGRAM(s) Oral at bedtime  dextrose 5%. 1000 milliLiter(s) (50 mL/Hr) IV Continuous <Continuous>  dextrose 5%. 1000 milliLiter(s) (100 mL/Hr) IV Continuous <Continuous>  dextrose 50% Injectable 25 Gram(s) IV Push once  dextrose 50% Injectable 12.5 Gram(s) IV Push once  dextrose 50% Injectable 25 Gram(s) IV Push once  famotidine    Tablet 20 milliGRAM(s) Oral two times a day  finerenone (Kerendia) 20mg 1 Tablet(s) 1 Tablet(s) Oral daily  glucagon  Injectable 1 milliGRAM(s) IntraMuscular once  heparin   Injectable 5000 Unit(s) SubCutaneous every 8 hours  insulin lispro (ADMELOG) corrective regimen sliding scale   SubCutaneous three times a day before meals  insulin lispro (ADMELOG) corrective regimen sliding scale   SubCutaneous at bedtime  levothyroxine 175 MICROGram(s) Oral daily  metoprolol succinate ER 25 milliGRAM(s) Oral daily  tiotropium 2.5 MICROgram(s) Inhaler 2 Puff(s) Inhalation daily    MEDICATIONS  (PRN):  acetaminophen     Tablet .. 650 milliGRAM(s) Oral every 6 hours PRN Temp greater or equal to 38C (100.4F), Mild Pain (1 - 3)  dextrose Oral Gel 15 Gram(s) Oral once PRN Blood Glucose LESS THAN 70 milliGRAM(s)/deciliter  melatonin 3 milliGRAM(s) Oral at bedtime PRN Insomnia  traMADol 50 milliGRAM(s) Oral every 6 hours PRN Severe Pain (7 - 10)  traMADol 25 milliGRAM(s) Oral every 6 hours PRN Moderate Pain (4 - 6)      Allergies    No Known Allergies    Intolerances        Vital Signs Last 24 Hrs  T(C): 36.4 (2024 05:51), Max: 36.8 (2024 21:30)  T(F): 97.6 (2024 05:51), Max: 98.3 (2024 21:30)  HR: 84 (2024 05:51) (77 - 84)  BP: 134/76 (2024 05:51) (134/76 - 165/90)  BP(mean): --  RR: 18 (2024 05:51) (18 - 18)  SpO2: 91% (2024 05:51) (91% - 93%)    Parameters below as of 2024 05:51  Patient On (Oxygen Delivery Method): room air        I&O's Summary    2024 07:01  -  2024 07:00  --------------------------------------------------------  IN: 240 mL / OUT: 1100 mL / NET: -860 mL          PHYSICAL EXAM:  TELE:   Constitutional: NAD, awake and alert, well-developed  HEENT: Moist Mucous Membranes, Anicteric  Pulmonary: Non-labored, breath sounds are clear bilaterally, No wheezing, crackles or rhonchi  Cardiovascular: Regular, S1 and S2 nl, No murmurs, rubs, gallops or clicks  Gastrointestinal: Bowel Sounds present, soft, nontender.   Lymph:+ le edema, chronic venous stasis changes   Psych:  Mood & affect appropriate    LABS: All Labs Reviewed:                        14.4   6.96  )-----------( 173      ( 2024 05:20 )             43.4                         15.2   9.59  )-----------( 200      ( 2024 15:00 )             45.7     2024 07:26    137    |  106    |  21     ----------------------------<  122    3.3     |  25     |  1.00   2024 05:20    138    |  108    |  23     ----------------------------<  117    3.1     |  24     |  0.91   2024 15:00    137    |  104    |  33     ----------------------------<  161    3.4     |  28     |  1.40     Ca    8.8        2024 07:26  Ca    8.5        2024 05:20  Ca    9.3        2024 15:00    TPro  7.0    /  Alb  3.1    /  TBili  0.7    /  DBili  x      /  AST  19     /  ALT  24     /  AlkPhos  111    2024 07:26  TPro  6.4    /  Alb  2.9    /  TBili  0.6    /  DBili  x      /  AST  16     /  ALT  22     /  AlkPhos  104    2024 05:20  TPro  7.1    /  Alb  3.2    /  TBili  0.5    /  DBili  x      /  AST  16     /  ALT  24     /  AlkPhos  112    2024 15:00    PT/INR - ( 2024 15:00 )   PT: 11.9 sec;   INR: 1.02 ratio         PTT - ( 2024 15:00 )  PTT:33.0 sec         EC Lead ECG:   Ventricular Rate 79 BPM    Atrial Rate 79 BPM    P-R Interval 288 ms    QRS Duration 164 ms    Q-T Interval 450 ms    QTC Calculation(Bazett) 516 ms    P Axis 65 degrees    R Axis -10 degrees    T Axis -11 degrees    Diagnosis Line Sinus rhythm with 1st degree AV block with occasional premature ventricular complexes  Right bundle branch block  Abnormal ECG  No previous ECGs available  Confirmed by enrique Woodruff ([id number]) on 2024 11:54:45 AM (24 @ 19:49)      ACC: [id number] EXAM:  US TTE W DOPPLER COMPLETE   ORDERED BY: NASIR TERESA     PROCEDURE DATE:  2023          INTERPRETATION:  Ordering Physician: NASIR S BRII [id number]    Indication: Syncope    Technician: JOHN    Study Quality: Technically difficult  A complete echocardiographic study was performed utilizing standard   protocol including spectral and color Doppler in all echocardiographic   windows.    Height: 180 cm  Weight: 106 kg  BSA: 2.25 m2  Blood Pressure: 110/60 mmHg    MEASUREMENTS  IVS: 1.5 cm  PWT: 1.4 cm  LA: 4.2 cm  AO: 4.0 cm  LVIDd: 4.2 cm  LVIDs: 3.5 cm    LVEF: 60%  RVSP: 18 mmHg  RA Pressure: 3 mmHg    FINDINGS  Left Ventricle: The left ventricle is normal in size. Increased left   ventricular wall thickness. The left ventricular systolic function is not   well visualized but appears grossly normal with an estimated EF of 60%.  Right Ventricle: The right ventricle is dilated with normal function.  Left Atrium: The left atrium is dilated.  Right Atrium: The right atrium is dilated.  Mitral Valve: Mitral annular calcification. Trace mitral regurgitation.  Aortic Valve: The aortic valve is structurally normal. No aortic   regurgitation.  Tricuspid Valve: The tricuspid valve is structurally normal. Trace   tricuspid regurgitation. Estimated pulmonary artery systolic pressure is   18 mmHg.  Pulmonic Valve: The pulmonic valve is not well visualized.  Diastolic Function: Indeterminate.  Pericardium/Pleura: No pericardial effusion visualized.  Aorta: The aortic root is normal in size.    IMPRESSION:  1. Normal left ventricular systolic function.  2. Increased left ventricular wall thickness.  3. Dilated left atrium, right atrium, right ventricle.    --- End of Report --

## 2024-01-29 NOTE — PROGRESS NOTE ADULT - SUBJECTIVE AND OBJECTIVE BOX
SUBJECTIVE:       Pt seen and examined at bedside. No acute events overnight. Patient doing well with no complaints overall. Reports pain well-controlled with medication. No CP, SOB, N/V, F/C, new N/T.    Vital Signs (24 Hrs):  T(C): 36.4 (01-29-24 @ 05:51), Max: 36.8 (01-28-24 @ 21:30)  HR: 84 (01-29-24 @ 05:51) (77 - 84)  BP: 134/76 (01-29-24 @ 05:51) (134/76 - 165/90)  RR: 18 (01-29-24 @ 05:51) (18 - 18)  SpO2: 91% (01-29-24 @ 05:51) (91% - 93%)  Wt(kg): --    LABS:                          14.4   6.96  )-----------( 173      ( 28 Jan 2024 05:20 )             43.4     01-28    138  |  108  |  23  ----------------------------<  117<H>  3.1<L>   |  24  |  0.91    Ca    8.5      28 Jan 2024 05:20    TPro  6.4  /  Alb  2.9<L>  /  TBili  0.6  /  DBili  x   /  AST  16  /  ALT  22  /  AlkPhos  104  01-28    LIVER FUNCTIONS - ( 28 Jan 2024 05:20 )  Alb: 2.9 g/dL / Pro: 6.4 g/dL / ALK PHOS: 104 U/L / ALT: 22 U/L / AST: 16 U/L / GGT: x           PT/INR - ( 27 Jan 2024 15:00 )   PT: 11.9 sec;   INR: 1.02 ratio         PTT - ( 27 Jan 2024 15:00 )  PTT:33.0 sec    Gen: NAD, Resting comfortably  LLE:  Lower extremity wounds, 2/2 lower extremity vascular issues  Skin intact at hip, no erythema or ecchymosis  Able to SLR  Mild TTP by hip, nowhere else along RLE  Motor: + EHL/FHL/TA/GSC  +SILT: SPN/DPN/Clara/Saph/Tib  + DP  Compartments soft and compressible  No calf tenderness  Pain on axial load and log roll    A/P:   78yMale with L GT fx, without evidence of IT extension    Nonoperative treatment with WBAT and PT daily discussed with patient, who is agreeable  FU MR JAZ Hip, ordered to assess for IT extension  PT/OT Daily  WBAT given low suspicion of IT extension  Please hold all SGLT2 inhibitors for diabetes, given that it is a contraindication to surgery  OK to continue with chemical DVT ppx at this time, no contraindications  Analgesia   Ice hip as tolerated  Medical recommendations appreciated   Discussed with Dr. Prado, who is in agreement with above plan

## 2024-01-29 NOTE — PATIENT CHOICE NOTE. - NSPTCHOICESTATE_GEN_ALL_CORE

## 2024-01-30 ENCOUNTER — TRANSCRIPTION ENCOUNTER (OUTPATIENT)
Age: 79
End: 2024-01-30

## 2024-01-30 LAB
ANION GAP SERPL CALC-SCNC: 7 MMOL/L — SIGNIFICANT CHANGE UP (ref 5–17)
ANION GAP SERPL CALC-SCNC: 9 MMOL/L — SIGNIFICANT CHANGE UP (ref 5–17)
APTT BLD: 34.4 SEC — SIGNIFICANT CHANGE UP (ref 24.5–35.6)
BASOPHILS # BLD AUTO: 0 K/UL — SIGNIFICANT CHANGE UP (ref 0–0.2)
BASOPHILS NFR BLD AUTO: 0 % — SIGNIFICANT CHANGE UP (ref 0–2)
BUN SERPL-MCNC: 26 MG/DL — HIGH (ref 7–23)
BUN SERPL-MCNC: 27 MG/DL — HIGH (ref 7–23)
CALCIUM SERPL-MCNC: 9 MG/DL — SIGNIFICANT CHANGE UP (ref 8.5–10.1)
CALCIUM SERPL-MCNC: 9.4 MG/DL — SIGNIFICANT CHANGE UP (ref 8.5–10.1)
CHLORIDE SERPL-SCNC: 105 MMOL/L — SIGNIFICANT CHANGE UP (ref 96–108)
CHLORIDE SERPL-SCNC: 105 MMOL/L — SIGNIFICANT CHANGE UP (ref 96–108)
CO2 SERPL-SCNC: 27 MMOL/L — SIGNIFICANT CHANGE UP (ref 22–31)
CO2 SERPL-SCNC: 28 MMOL/L — SIGNIFICANT CHANGE UP (ref 22–31)
CREAT SERPL-MCNC: 1.1 MG/DL — SIGNIFICANT CHANGE UP (ref 0.5–1.3)
CREAT SERPL-MCNC: 1.1 MG/DL — SIGNIFICANT CHANGE UP (ref 0.5–1.3)
EGFR: 70 ML/MIN/1.73M2 — SIGNIFICANT CHANGE UP
EGFR: 70 ML/MIN/1.73M2 — SIGNIFICANT CHANGE UP
EOSINOPHIL # BLD AUTO: 0 K/UL — SIGNIFICANT CHANGE UP (ref 0–0.5)
EOSINOPHIL NFR BLD AUTO: 0 % — SIGNIFICANT CHANGE UP (ref 0–6)
GLUCOSE SERPL-MCNC: 139 MG/DL — HIGH (ref 70–99)
GLUCOSE SERPL-MCNC: 149 MG/DL — HIGH (ref 70–99)
HCT VFR BLD CALC: 45.2 % — SIGNIFICANT CHANGE UP (ref 39–50)
HCT VFR BLD CALC: 47.4 % — SIGNIFICANT CHANGE UP (ref 39–50)
HGB BLD-MCNC: 15.2 G/DL — SIGNIFICANT CHANGE UP (ref 13–17)
HGB BLD-MCNC: 15.4 G/DL — SIGNIFICANT CHANGE UP (ref 13–17)
INR BLD: 0.96 RATIO — SIGNIFICANT CHANGE UP (ref 0.85–1.18)
LYMPHOCYTES # BLD AUTO: 0.27 K/UL — LOW (ref 1–3.3)
LYMPHOCYTES # BLD AUTO: 2 % — LOW (ref 13–44)
MCHC RBC-ENTMCNC: 28.9 PG — SIGNIFICANT CHANGE UP (ref 27–34)
MCHC RBC-ENTMCNC: 29.3 PG — SIGNIFICANT CHANGE UP (ref 27–34)
MCHC RBC-ENTMCNC: 32.5 GM/DL — SIGNIFICANT CHANGE UP (ref 32–36)
MCHC RBC-ENTMCNC: 33.6 GM/DL — SIGNIFICANT CHANGE UP (ref 32–36)
MCV RBC AUTO: 87.1 FL — SIGNIFICANT CHANGE UP (ref 80–100)
MCV RBC AUTO: 88.9 FL — SIGNIFICANT CHANGE UP (ref 80–100)
MONOCYTES # BLD AUTO: 0.27 K/UL — SIGNIFICANT CHANGE UP (ref 0–0.9)
MONOCYTES NFR BLD AUTO: 2 % — SIGNIFICANT CHANGE UP (ref 2–14)
NEUTROPHILS # BLD AUTO: 12.88 K/UL — HIGH (ref 1.8–7.4)
NEUTROPHILS NFR BLD AUTO: 93 % — HIGH (ref 43–77)
NRBC # BLD: 0 /100 WBCS — SIGNIFICANT CHANGE UP (ref 0–0)
NRBC # BLD: SIGNIFICANT CHANGE UP /100 WBCS (ref 0–0)
PLATELET # BLD AUTO: 171 K/UL — SIGNIFICANT CHANGE UP (ref 150–400)
PLATELET # BLD AUTO: 182 K/UL — SIGNIFICANT CHANGE UP (ref 150–400)
POTASSIUM SERPL-MCNC: 3.4 MMOL/L — LOW (ref 3.5–5.3)
POTASSIUM SERPL-MCNC: 4.1 MMOL/L — SIGNIFICANT CHANGE UP (ref 3.5–5.3)
POTASSIUM SERPL-SCNC: 3.4 MMOL/L — LOW (ref 3.5–5.3)
POTASSIUM SERPL-SCNC: 4.1 MMOL/L — SIGNIFICANT CHANGE UP (ref 3.5–5.3)
PROTHROM AB SERPL-ACNC: 11.3 SEC — SIGNIFICANT CHANGE UP (ref 9.5–13)
RBC # BLD: 5.19 M/UL — SIGNIFICANT CHANGE UP (ref 4.2–5.8)
RBC # BLD: 5.33 M/UL — SIGNIFICANT CHANGE UP (ref 4.2–5.8)
RBC # FLD: 15 % — HIGH (ref 10.3–14.5)
RBC # FLD: 15.1 % — HIGH (ref 10.3–14.5)
SODIUM SERPL-SCNC: 140 MMOL/L — SIGNIFICANT CHANGE UP (ref 135–145)
SODIUM SERPL-SCNC: 141 MMOL/L — SIGNIFICANT CHANGE UP (ref 135–145)
WBC # BLD: 13.42 K/UL — HIGH (ref 3.8–10.5)
WBC # BLD: 7.13 K/UL — SIGNIFICANT CHANGE UP (ref 3.8–10.5)
WBC # FLD AUTO: 13.42 K/UL — HIGH (ref 3.8–10.5)
WBC # FLD AUTO: 7.13 K/UL — SIGNIFICANT CHANGE UP (ref 3.8–10.5)

## 2024-01-30 PROCEDURE — 99232 SBSQ HOSP IP/OBS MODERATE 35: CPT

## 2024-01-30 DEVICE — SCREW LOKG TI STRL 5X44MM: Type: IMPLANTABLE DEVICE | Site: LEFT | Status: FUNCTIONAL

## 2024-01-30 DEVICE — NAIL CANN TI 130DEG 11X170MM: Type: IMPLANTABLE DEVICE | Site: LEFT | Status: FUNCTIONAL

## 2024-01-30 DEVICE — IMPLANTABLE DEVICE: Type: IMPLANTABLE DEVICE | Site: LEFT | Status: FUNCTIONAL

## 2024-01-30 RX ORDER — CEFAZOLIN SODIUM 1 G
2000 VIAL (EA) INJECTION ONCE
Refills: 0 | Status: DISCONTINUED | OUTPATIENT
Start: 2024-01-30 | End: 2024-01-30

## 2024-01-30 RX ORDER — OXYCODONE HYDROCHLORIDE 5 MG/1
5 TABLET ORAL
Refills: 0 | Status: DISCONTINUED | OUTPATIENT
Start: 2024-01-30 | End: 2024-01-31

## 2024-01-30 RX ORDER — POTASSIUM CHLORIDE 20 MEQ
40 PACKET (EA) ORAL ONCE
Refills: 0 | Status: DISCONTINUED | OUTPATIENT
Start: 2024-01-30 | End: 2024-01-30

## 2024-01-30 RX ORDER — OXYCODONE HYDROCHLORIDE 5 MG/1
10 TABLET ORAL
Refills: 0 | Status: DISCONTINUED | OUTPATIENT
Start: 2024-01-30 | End: 2024-01-31

## 2024-01-30 RX ORDER — BUMETANIDE 0.25 MG/ML
4 INJECTION INTRAMUSCULAR; INTRAVENOUS AT BEDTIME
Refills: 0 | Status: DISCONTINUED | OUTPATIENT
Start: 2024-01-30 | End: 2024-01-31

## 2024-01-30 RX ORDER — DEXTROSE 50 % IN WATER 50 %
25 SYRINGE (ML) INTRAVENOUS ONCE
Refills: 0 | Status: DISCONTINUED | OUTPATIENT
Start: 2024-01-30 | End: 2024-01-30

## 2024-01-30 RX ORDER — SENNA PLUS 8.6 MG/1
2 TABLET ORAL AT BEDTIME
Refills: 0 | Status: DISCONTINUED | OUTPATIENT
Start: 2024-01-30 | End: 2024-01-31

## 2024-01-30 RX ORDER — HYDROMORPHONE HYDROCHLORIDE 2 MG/ML
0.5 INJECTION INTRAMUSCULAR; INTRAVENOUS; SUBCUTANEOUS
Refills: 0 | Status: DISCONTINUED | OUTPATIENT
Start: 2024-01-30 | End: 2024-01-31

## 2024-01-30 RX ORDER — GLUCAGON INJECTION, SOLUTION 0.5 MG/.1ML
1 INJECTION, SOLUTION SUBCUTANEOUS ONCE
Refills: 0 | Status: DISCONTINUED | OUTPATIENT
Start: 2024-01-30 | End: 2024-01-30

## 2024-01-30 RX ORDER — MAGNESIUM HYDROXIDE 400 MG/1
30 TABLET, CHEWABLE ORAL DAILY
Refills: 0 | Status: DISCONTINUED | OUTPATIENT
Start: 2024-01-30 | End: 2024-01-31

## 2024-01-30 RX ORDER — TIOTROPIUM BROMIDE 18 UG/1
2 CAPSULE ORAL; RESPIRATORY (INHALATION) DAILY
Refills: 0 | Status: DISCONTINUED | OUTPATIENT
Start: 2024-01-30 | End: 2024-01-31

## 2024-01-30 RX ORDER — POTASSIUM CHLORIDE 20 MEQ
40 PACKET (EA) ORAL EVERY 4 HOURS
Refills: 0 | Status: COMPLETED | OUTPATIENT
Start: 2024-01-30 | End: 2024-01-30

## 2024-01-30 RX ORDER — ACETAMINOPHEN 500 MG
1000 TABLET ORAL ONCE
Refills: 0 | Status: DISCONTINUED | OUTPATIENT
Start: 2024-01-30 | End: 2024-01-30

## 2024-01-30 RX ORDER — HEPARIN SODIUM 5000 [USP'U]/ML
5000 INJECTION INTRAVENOUS; SUBCUTANEOUS EVERY 8 HOURS
Refills: 0 | Status: DISCONTINUED | OUTPATIENT
Start: 2024-01-31 | End: 2024-01-31

## 2024-01-30 RX ORDER — LEVOTHYROXINE SODIUM 125 MCG
175 TABLET ORAL DAILY
Refills: 0 | Status: DISCONTINUED | OUTPATIENT
Start: 2024-01-30 | End: 2024-01-31

## 2024-01-30 RX ORDER — DEXTROSE 50 % IN WATER 50 %
15 SYRINGE (ML) INTRAVENOUS ONCE
Refills: 0 | Status: DISCONTINUED | OUTPATIENT
Start: 2024-01-30 | End: 2024-01-30

## 2024-01-30 RX ORDER — METOPROLOL TARTRATE 50 MG
25 TABLET ORAL DAILY
Refills: 0 | Status: DISCONTINUED | OUTPATIENT
Start: 2024-01-31 | End: 2024-01-31

## 2024-01-30 RX ORDER — CEFAZOLIN SODIUM 1 G
2000 VIAL (EA) INJECTION ONCE
Refills: 0 | Status: COMPLETED | OUTPATIENT
Start: 2024-01-30 | End: 2024-01-30

## 2024-01-30 RX ORDER — SODIUM CHLORIDE 9 MG/ML
1000 INJECTION, SOLUTION INTRAVENOUS
Refills: 0 | Status: DISCONTINUED | OUTPATIENT
Start: 2024-01-30 | End: 2024-01-31

## 2024-01-30 RX ORDER — BUMETANIDE 0.25 MG/ML
6 INJECTION INTRAMUSCULAR; INTRAVENOUS DAILY
Refills: 0 | Status: DISCONTINUED | OUTPATIENT
Start: 2024-01-30 | End: 2024-01-31

## 2024-01-30 RX ORDER — DEXTROSE 50 % IN WATER 50 %
25 SYRINGE (ML) INTRAVENOUS ONCE
Refills: 0 | Status: DISCONTINUED | OUTPATIENT
Start: 2024-01-30 | End: 2024-01-31

## 2024-01-30 RX ORDER — SODIUM CHLORIDE 9 MG/ML
1000 INJECTION, SOLUTION INTRAVENOUS
Refills: 0 | Status: DISCONTINUED | OUTPATIENT
Start: 2024-01-30 | End: 2024-01-30

## 2024-01-30 RX ORDER — DEXTROSE 50 % IN WATER 50 %
15 SYRINGE (ML) INTRAVENOUS ONCE
Refills: 0 | Status: DISCONTINUED | OUTPATIENT
Start: 2024-01-30 | End: 2024-01-31

## 2024-01-30 RX ORDER — ONDANSETRON 8 MG/1
4 TABLET, FILM COATED ORAL EVERY 6 HOURS
Refills: 0 | Status: DISCONTINUED | OUTPATIENT
Start: 2024-01-30 | End: 2024-01-30

## 2024-01-30 RX ORDER — DEXTROSE 50 % IN WATER 50 %
12.5 SYRINGE (ML) INTRAVENOUS ONCE
Refills: 0 | Status: DISCONTINUED | OUTPATIENT
Start: 2024-01-30 | End: 2024-01-30

## 2024-01-30 RX ORDER — INSULIN LISPRO 100/ML
VIAL (ML) SUBCUTANEOUS EVERY 6 HOURS
Refills: 0 | Status: DISCONTINUED | OUTPATIENT
Start: 2024-01-30 | End: 2024-01-30

## 2024-01-30 RX ORDER — ATORVASTATIN CALCIUM 80 MG/1
40 TABLET, FILM COATED ORAL AT BEDTIME
Refills: 0 | Status: DISCONTINUED | OUTPATIENT
Start: 2024-01-30 | End: 2024-01-31

## 2024-01-30 RX ORDER — POLYETHYLENE GLYCOL 3350 17 G/17G
17 POWDER, FOR SOLUTION ORAL AT BEDTIME
Refills: 0 | Status: DISCONTINUED | OUTPATIENT
Start: 2024-01-30 | End: 2024-01-31

## 2024-01-30 RX ORDER — GLUCAGON INJECTION, SOLUTION 0.5 MG/.1ML
1 INJECTION, SOLUTION SUBCUTANEOUS ONCE
Refills: 0 | Status: DISCONTINUED | OUTPATIENT
Start: 2024-01-30 | End: 2024-01-31

## 2024-01-30 RX ORDER — BUDESONIDE AND FORMOTEROL FUMARATE DIHYDRATE 160; 4.5 UG/1; UG/1
2 AEROSOL RESPIRATORY (INHALATION)
Refills: 0 | Status: DISCONTINUED | OUTPATIENT
Start: 2024-01-30 | End: 2024-01-31

## 2024-01-30 RX ORDER — TRAMADOL HYDROCHLORIDE 50 MG/1
50 TABLET ORAL EVERY 6 HOURS
Refills: 0 | Status: DISCONTINUED | OUTPATIENT
Start: 2024-01-30 | End: 2024-01-31

## 2024-01-30 RX ORDER — HYDROMORPHONE HYDROCHLORIDE 2 MG/ML
0.5 INJECTION INTRAMUSCULAR; INTRAVENOUS; SUBCUTANEOUS
Refills: 0 | Status: DISCONTINUED | OUTPATIENT
Start: 2024-01-30 | End: 2024-01-30

## 2024-01-30 RX ORDER — ONDANSETRON 8 MG/1
4 TABLET, FILM COATED ORAL ONCE
Refills: 0 | Status: DISCONTINUED | OUTPATIENT
Start: 2024-01-30 | End: 2024-01-30

## 2024-01-30 RX ORDER — CEFAZOLIN SODIUM 1 G
2000 VIAL (EA) INJECTION ONCE
Refills: 0 | Status: DISCONTINUED | OUTPATIENT
Start: 2024-01-30 | End: 2024-01-31

## 2024-01-30 RX ORDER — INSULIN LISPRO 100/ML
VIAL (ML) SUBCUTANEOUS
Refills: 0 | Status: DISCONTINUED | OUTPATIENT
Start: 2024-01-30 | End: 2024-01-31

## 2024-01-30 RX ADMIN — BUMETANIDE 6 MILLIGRAM(S): 0.25 INJECTION INTRAMUSCULAR; INTRAVENOUS at 05:56

## 2024-01-30 RX ADMIN — SODIUM CHLORIDE 100 MILLILITER(S): 9 INJECTION, SOLUTION INTRAVENOUS at 00:15

## 2024-01-30 RX ADMIN — TIOTROPIUM BROMIDE 2 PUFF(S): 18 CAPSULE ORAL; RESPIRATORY (INHALATION) at 06:01

## 2024-01-30 RX ADMIN — ATORVASTATIN CALCIUM 40 MILLIGRAM(S): 80 TABLET, FILM COATED ORAL at 23:20

## 2024-01-30 RX ADMIN — Medication 100 MILLIGRAM(S): at 23:23

## 2024-01-30 RX ADMIN — HYDROMORPHONE HYDROCHLORIDE 0.5 MILLIGRAM(S): 2 INJECTION INTRAMUSCULAR; INTRAVENOUS; SUBCUTANEOUS at 18:36

## 2024-01-30 RX ADMIN — HYDROMORPHONE HYDROCHLORIDE 0.5 MILLIGRAM(S): 2 INJECTION INTRAMUSCULAR; INTRAVENOUS; SUBCUTANEOUS at 19:03

## 2024-01-30 RX ADMIN — HYDROMORPHONE HYDROCHLORIDE 0.5 MILLIGRAM(S): 2 INJECTION INTRAMUSCULAR; INTRAVENOUS; SUBCUTANEOUS at 18:46

## 2024-01-30 RX ADMIN — SENNA PLUS 2 TABLET(S): 8.6 TABLET ORAL at 23:20

## 2024-01-30 RX ADMIN — POLYETHYLENE GLYCOL 3350 17 GRAM(S): 17 POWDER, FOR SOLUTION ORAL at 23:22

## 2024-01-30 RX ADMIN — Medication 175 MICROGRAM(S): at 05:56

## 2024-01-30 RX ADMIN — HYDROMORPHONE HYDROCHLORIDE 0.5 MILLIGRAM(S): 2 INJECTION INTRAMUSCULAR; INTRAVENOUS; SUBCUTANEOUS at 19:18

## 2024-01-30 RX ADMIN — BUMETANIDE 4 MILLIGRAM(S): 0.25 INJECTION INTRAMUSCULAR; INTRAVENOUS at 23:21

## 2024-01-30 RX ADMIN — HYDROMORPHONE HYDROCHLORIDE 0.5 MILLIGRAM(S): 2 INJECTION INTRAMUSCULAR; INTRAVENOUS; SUBCUTANEOUS at 19:01

## 2024-01-30 RX ADMIN — Medication 25 MILLIGRAM(S): at 05:56

## 2024-01-30 RX ADMIN — SODIUM CHLORIDE 75 MILLILITER(S): 9 INJECTION, SOLUTION INTRAVENOUS at 18:36

## 2024-01-30 RX ADMIN — BUDESONIDE AND FORMOTEROL FUMARATE DIHYDRATE 2 PUFF(S): 160; 4.5 AEROSOL RESPIRATORY (INHALATION) at 06:00

## 2024-01-30 RX ADMIN — Medication 40 MILLIEQUIVALENT(S): at 08:48

## 2024-01-30 RX ADMIN — HYDROMORPHONE HYDROCHLORIDE 0.5 MILLIGRAM(S): 2 INJECTION INTRAMUSCULAR; INTRAVENOUS; SUBCUTANEOUS at 18:51

## 2024-01-30 RX ADMIN — FAMOTIDINE 20 MILLIGRAM(S): 10 INJECTION INTRAVENOUS at 05:56

## 2024-01-30 NOTE — PROGRESS NOTE ADULT - SUBJECTIVE AND OBJECTIVE BOX
[facility] -- Leyla Garcia Pannella, Patel, Savella, Goodger, Cohen: Office # [phone]    Follow Up: Cardiac optimization, HTN     Subjective/Observations: Patient awake, alert, resting in bed. Denies chest pain, palpitations and dizziness. Denies any difficulty breathing. No orthopnea and PND. Tolerating room air.     REVIEW OF SYSTEMS: All other review of systems are negative unless indicated above    PAST MEDICAL & SURGICAL HISTORY:  HTN (hypertension)      DM2 (diabetes mellitus, type 2)      Chronic atrial fibrillation      Congestive heart disease      COPD (chronic obstructive pulmonary disease)      Hypothyroidism      Hypothyroidism      History of prior ablation treatment      H/O rhinoplasty      H/O shoulder surgery      History of tonsillectomy      History of appendectomy    MEDICATIONS  (STANDING):  atorvastatin 40 milliGRAM(s) Oral at bedtime  budesonide  80 MICROgram(s)/formoterol 4.5 MICROgram(s) Inhaler 2 Puff(s) Inhalation two times a day  buMETAnide 6 milliGRAM(s) Oral daily  buMETAnide 4 milliGRAM(s) Oral at bedtime  dextrose 5%. 1000 milliLiter(s) (50 mL/Hr) IV Continuous <Continuous>  dextrose 5%. 1000 milliLiter(s) (100 mL/Hr) IV Continuous <Continuous>  dextrose 5%. 1000 milliLiter(s) (50 mL/Hr) IV Continuous <Continuous>  dextrose 5%. 1000 milliLiter(s) (100 mL/Hr) IV Continuous <Continuous>  dextrose 50% Injectable 12.5 Gram(s) IV Push once  dextrose 50% Injectable 25 Gram(s) IV Push once  dextrose 50% Injectable 25 Gram(s) IV Push once  famotidine    Tablet 20 milliGRAM(s) Oral two times a day  finerenone (Kerendia) 20mg 1 Tablet(s) 1 Tablet(s) Oral daily  glucagon  Injectable 1 milliGRAM(s) IntraMuscular once  insulin lispro (ADMELOG) corrective regimen sliding scale   SubCutaneous every 6 hours  lactated ringers. 1000 milliLiter(s) (100 mL/Hr) IV Continuous <Continuous>  levothyroxine 175 MICROGram(s) Oral daily  metoprolol succinate ER 25 milliGRAM(s) Oral daily  potassium chloride   Powder 40 milliEquivalent(s) Oral every 4 hours  tiotropium 2.5 MICROgram(s) Inhaler 2 Puff(s) Inhalation daily    MEDICATIONS  (PRN):  acetaminophen     Tablet .. 650 milliGRAM(s) Oral every 6 hours PRN Temp greater or equal to 38C (100.4F), Mild Pain (1 - 3)  dextrose Oral Gel 15 Gram(s) Oral once PRN Blood Glucose LESS THAN 70 milliGRAM(s)/deciliter  melatonin 3 milliGRAM(s) Oral at bedtime PRN Insomnia  traMADol 50 milliGRAM(s) Oral every 6 hours PRN Severe Pain (7 - 10)  traMADol 25 milliGRAM(s) Oral every 6 hours PRN Moderate Pain (4 - 6)    Allergies  No Known Allergies    Intolerances      Vital Signs Last 24 Hrs  T(C): 36.6 (30 Jan 2024 05:51), Max: 36.7 (30 Jan 2024 00:34)  T(F): 97.9 (30 Jan 2024 05:51), Max: 98 (30 Jan 2024 00:34)  HR: 71 (30 Jan 2024 05:51) (71 - 86)  BP: 150/84 (30 Jan 2024 05:51) (134/81 - 150/84)  BP(mean): --  RR: 18 (30 Jan 2024 05:51) (18 - 18)  SpO2: 95% (30 Jan 2024 05:51) (93% - 95%)    Parameters below as of 30 Jan 2024 05:51  Patient On (Oxygen Delivery Method): room air      I&O's Summary    29 Jan 2024 07:01  -  30 Jan 2024 07:00  --------------------------------------------------------  IN: 800 mL / OUT: 1100 mL / NET: -300 mL          TELE:   PHYSICAL EXAM:  Constitutional: NAD, awake and alert  HEENT: Moist Mucous Membranes, Anicteric  Pulmonary: Non-labored, breath sounds are clear bilaterally, No wheezing, rales or rhonchi  Cardiovascular: Regular, S1 and S2, No murmurs, No rubs, gallops or clicks  Gastrointestinal:  soft, nontender, nondistended   Lymph: + peripheral edema. No lymphadenopathy.   Skin: No visible rashes or ulcers.  Psych:  Mood & affect appropriate      LABS: All Labs Reviewed:                        15.2   7.13  )-----------( 182      ( 30 Jan 2024 05:22 )             45.2                         14.4   6.96  )-----------( 173      ( 28 Jan 2024 05:20 )             43.4                         15.2   9.59  )-----------( 200      ( 27 Jan 2024 15:00 )             45.7     30 Jan 2024 05:22    140    |  105    |  27     ----------------------------<  139    3.4     |  28     |  1.10   29 Jan 2024 07:26    137    |  106    |  21     ----------------------------<  122    3.3     |  25     |  1.00   28 Jan 2024 05:20    138    |  108    |  23     ----------------------------<  117    3.1     |  24     |  0.91     Ca    9.0        30 Jan 2024 05:22  Ca    8.8        29 Jan 2024 07:26  Ca    8.5        28 Jan 2024 05:20  Phos  2.9       29 Jan 2024 07:26  Mg     2.1       29 Jan 2024 07:26    TPro  7.0    /  Alb  3.1    /  TBili  0.7    /  DBili  x      /  AST  19     /  ALT  24     /  AlkPhos  111    29 Jan 2024 07:26  TPro  6.4    /  Alb  2.9    /  TBili  0.6    /  DBili  x      /  AST  16     /  ALT  22     /  AlkPhos  104    28 Jan 2024 05:20  TPro  7.1    /  Alb  3.2    /  TBili  0.5    /  DBili  x      /  AST  16     /  ALT  24     /  AlkPhos  112    27 Jan 2024 15:00   LIVER FUNCTIONS - ( 29 Jan 2024 07:26 )  Alb: 3.1 g/dL / Pro: 7.0 g/dL / ALK PHOS: 111 U/L / ALT: 24 U/L / AST: 19 U/L / GGT: x           PT/INR - ( 30 Jan 2024 05:22 )   PT: 11.3 sec;   INR: 0.96 ratio         PTT - ( 30 Jan 2024 05:22 )  PTT:34.4 sec  12 Lead ECG:   Ventricular Rate 79 BPM    Atrial Rate 79 BPM    P-R Interval 288 ms    QRS Duration 164 ms    Q-T Interval 450 ms    QTC Calculation(Bazett) 516 ms    P Axis 65 degrees    R Axis -10 degrees    T Axis -11 degrees    Diagnosis Line Sinus rhythm with 1st degree AV block with occasional premature ventricular complexes  Right bundle branch block  Abnormal ECG  No previous ECGs available  Confirmed by enrique Woodruff ([id number]) on 1/28/2024 11:54:45 AM (01-27-24 @ 19:49)      ACC: [id number] EXAM:  US TTE W DOPPLER COMPLETE   ORDERED BY: NASIR TERESA     PROCEDURE DATE:  07/25/2023          INTERPRETATION:  Ordering Physician: NASIR TERESA [id number]    Indication: Syncope    Technician: JOHN    Study Quality: Technically difficult  A complete echocardiographic study was performed utilizing standard   protocol including spectral and color Doppler in all echocardiographic   windows.    Height: 180 cm  Weight: 106 kg  BSA: 2.25 m2  Blood Pressure: 110/60 mmHg    MEASUREMENTS  IVS: 1.5 cm  PWT: 1.4 cm  LA: 4.2 cm  AO: 4.0 cm  LVIDd: 4.2 cm  LVIDs: 3.5 cm    LVEF: 60%  RVSP: 18 mmHg  RA Pressure: 3 mmHg    FINDINGS  Left Ventricle: The left ventricle is normal in size. Increased left   ventricular wall thickness. The left ventricular systolic function is not   well visualized but appears grossly normal with an estimated EF of 60%.  Right Ventricle: The right ventricle is dilated with normal function.  Left Atrium: The left atrium is dilated.  Right Atrium: Theright atrium is dilated.  Mitral Valve: Mitral annular calcification. Trace mitral regurgitation.  Aortic Valve: The aortic valve is structurally normal. No aortic   regurgitation.  Tricuspid Valve: The tricuspid valve is structurally normal. Trace   tricuspid regurgitation. Estimated pulmonary artery systolic pressure is   18 mmHg.  Pulmonic Valve: The pulmonic valve is not well visualized.  Diastolic Function: Indeterminate.  Pericardium/Pleura: No pericardial effusion visualized.  Aorta: The aortic root is normal in size.    IMPRESSION:  1. Normal left ventricular systolic function.  2. Increased left ventricular wall thickness.  3. Dilated left atrium, right atrium, right ventricle.    --- End of Report ---  BRIAN GEIGER MD; Attending Cardiologist  This document has been electronically signed. Jul 25 2023  9:58AM   [facility] -- Leyla Garcia Pannella, Patel, Savella, Goodger, Cohen: Office # [phone]    Follow Up: Cardiac optimization, HTN     Subjective/Observations: Patient awake, alert, OOB to chair.  Denies chest pain, palpitations and dizziness. Denies any difficulty breathing. No orthopnea and PND. Tolerating room air. IVF @ 100    REVIEW OF SYSTEMS: All other review of systems are negative unless indicated above    PAST MEDICAL & SURGICAL HISTORY:  HTN (hypertension)      DM2 (diabetes mellitus, type 2)      Chronic atrial fibrillation      Congestive heart disease      COPD (chronic obstructive pulmonary disease)      Hypothyroidism      Hypothyroidism      History of prior ablation treatment      H/O rhinoplasty      H/O shoulder surgery      History of tonsillectomy      History of appendectomy    MEDICATIONS  (STANDING):  atorvastatin 40 milliGRAM(s) Oral at bedtime  budesonide  80 MICROgram(s)/formoterol 4.5 MICROgram(s) Inhaler 2 Puff(s) Inhalation two times a day  buMETAnide 6 milliGRAM(s) Oral daily  buMETAnide 4 milliGRAM(s) Oral at bedtime  dextrose 5%. 1000 milliLiter(s) (50 mL/Hr) IV Continuous <Continuous>  dextrose 5%. 1000 milliLiter(s) (100 mL/Hr) IV Continuous <Continuous>  dextrose 5%. 1000 milliLiter(s) (50 mL/Hr) IV Continuous <Continuous>  dextrose 5%. 1000 milliLiter(s) (100 mL/Hr) IV Continuous <Continuous>  dextrose 50% Injectable 12.5 Gram(s) IV Push once  dextrose 50% Injectable 25 Gram(s) IV Push once  dextrose 50% Injectable 25 Gram(s) IV Push once  famotidine    Tablet 20 milliGRAM(s) Oral two times a day  finerenone (Kerendia) 20mg 1 Tablet(s) 1 Tablet(s) Oral daily  glucagon  Injectable 1 milliGRAM(s) IntraMuscular once  insulin lispro (ADMELOG) corrective regimen sliding scale   SubCutaneous every 6 hours  lactated ringers. 1000 milliLiter(s) (100 mL/Hr) IV Continuous <Continuous>  levothyroxine 175 MICROGram(s) Oral daily  metoprolol succinate ER 25 milliGRAM(s) Oral daily  potassium chloride   Powder 40 milliEquivalent(s) Oral every 4 hours  tiotropium 2.5 MICROgram(s) Inhaler 2 Puff(s) Inhalation daily    MEDICATIONS  (PRN):  acetaminophen     Tablet .. 650 milliGRAM(s) Oral every 6 hours PRN Temp greater or equal to 38C (100.4F), Mild Pain (1 - 3)  dextrose Oral Gel 15 Gram(s) Oral once PRN Blood Glucose LESS THAN 70 milliGRAM(s)/deciliter  melatonin 3 milliGRAM(s) Oral at bedtime PRN Insomnia  traMADol 50 milliGRAM(s) Oral every 6 hours PRN Severe Pain (7 - 10)  traMADol 25 milliGRAM(s) Oral every 6 hours PRN Moderate Pain (4 - 6)    Allergies  No Known Allergies    Intolerances      Vital Signs Last 24 Hrs  T(C): 36.6 (30 Jan 2024 05:51), Max: 36.7 (30 Jan 2024 00:34)  T(F): 97.9 (30 Jan 2024 05:51), Max: 98 (30 Jan 2024 00:34)  HR: 71 (30 Jan 2024 05:51) (71 - 86)  BP: 150/84 (30 Jan 2024 05:51) (134/81 - 150/84)  BP(mean): --  RR: 18 (30 Jan 2024 05:51) (18 - 18)  SpO2: 95% (30 Jan 2024 05:51) (93% - 95%)    Parameters below as of 30 Jan 2024 05:51  Patient On (Oxygen Delivery Method): room air      I&O's Summary    29 Jan 2024 07:01  -  30 Jan 2024 07:00  --------------------------------------------------------  IN: 800 mL / OUT: 1100 mL / NET: -300 mL          TELE: SR 70-90s, PVC, nonsustained trigeminy  PHYSICAL EXAM:  Constitutional: NAD, awake and alert  HEENT: Moist Mucous Membranes, Anicteric  Pulmonary: Non-labored, breath sounds are clear bilaterally, No wheezing, rales or rhonchi  Cardiovascular: Regular, S1 and S2, No murmurs, No rubs, gallops or clicks  Gastrointestinal:  soft, nontender, nondistended   MSK: Lt hip limited ROM  Lymph: +1 peripheral edema with chronic venous stasis skin changes. No lymphadenopathy.   Skin: No visible rashes or ulcers.  Psych:  Mood & affect appropriate      LABS: All Labs Reviewed:                        15.2   7.13  )-----------( 182      ( 30 Jan 2024 05:22 )             45.2                         14.4   6.96  )-----------( 173      ( 28 Jan 2024 05:20 )             43.4                         15.2   9.59  )-----------( 200      ( 27 Jan 2024 15:00 )             45.7     30 Jan 2024 05:22    140    |  105    |  27     ----------------------------<  139    3.4     |  28     |  1.10   29 Jan 2024 07:26    137    |  106    |  21     ----------------------------<  122    3.3     |  25     |  1.00   28 Jan 2024 05:20    138    |  108    |  23     ----------------------------<  117    3.1     |  24     |  0.91     Ca    9.0        30 Jan 2024 05:22  Ca    8.8        29 Jan 2024 07:26  Ca    8.5        28 Jan 2024 05:20  Phos  2.9       29 Jan 2024 07:26  Mg     2.1       29 Jan 2024 07:26    TPro  7.0    /  Alb  3.1    /  TBili  0.7    /  DBili  x      /  AST  19     /  ALT  24     /  AlkPhos  111    29 Jan 2024 07:26  TPro  6.4    /  Alb  2.9    /  TBili  0.6    /  DBili  x      /  AST  16     /  ALT  22     /  AlkPhos  104    28 Jan 2024 05:20  TPro  7.1    /  Alb  3.2    /  TBili  0.5    /  DBili  x      /  AST  16     /  ALT  24     /  AlkPhos  112    27 Jan 2024 15:00   LIVER FUNCTIONS - ( 29 Jan 2024 07:26 )  Alb: 3.1 g/dL / Pro: 7.0 g/dL / ALK PHOS: 111 U/L / ALT: 24 U/L / AST: 19 U/L / GGT: x           PT/INR - ( 30 Jan 2024 05:22 )   PT: 11.3 sec;   INR: 0.96 ratio         PTT - ( 30 Jan 2024 05:22 )  PTT:34.4 sec  12 Lead ECG:   Ventricular Rate 79 BPM    Atrial Rate 79 BPM    P-R Interval 288 ms    QRS Duration 164 ms    Q-T Interval 450 ms    QTC Calculation(Bazett) 516 ms    P Axis 65 degrees    R Axis -10 degrees    T Axis -11 degrees    Diagnosis Line Sinus rhythm with 1st degree AV block with occasional premature ventricular complexes  Right bundle branch block  Abnormal ECG  No previous ECGs available  Confirmed by enrique Woodruff ([id number]) on 1/28/2024 11:54:45 AM (01-27-24 @ 19:49)      ACC: [id number] EXAM:  US TTE W DOPPLER COMPLETE   ORDERED BY: NASIR TERESA     PROCEDURE DATE:  07/25/2023          INTERPRETATION:  Ordering Physician: NASIR TERESA [id number]    Indication: Syncope    Technician: JOHN    Study Quality: Technically difficult  A complete echocardiographic study was performed utilizing standard   protocol including spectral and color Doppler in all echocardiographic   windows.    Height: 180 cm  Weight: 106 kg  BSA: 2.25 m2  Blood Pressure: 110/60 mmHg    MEASUREMENTS  IVS: 1.5 cm  PWT: 1.4 cm  LA: 4.2 cm  AO: 4.0 cm  LVIDd: 4.2 cm  LVIDs: 3.5 cm    LVEF: 60%  RVSP: 18 mmHg  RA Pressure: 3 mmHg    FINDINGS  Left Ventricle: The left ventricle is normal in size. Increased left   ventricular wall thickness. The left ventricular systolic function is not   well visualized but appears grossly normal with an estimated EF of 60%.  Right Ventricle: The right ventricle is dilated with normal function.  Left Atrium: The left atrium is dilated.  Right Atrium: Theright atrium is dilated.  Mitral Valve: Mitral annular calcification. Trace mitral regurgitation.  Aortic Valve: The aortic valve is structurally normal. No aortic   regurgitation.  Tricuspid Valve: The tricuspid valve is structurally normal. Trace   tricuspid regurgitation. Estimated pulmonary artery systolic pressure is   18 mmHg.  Pulmonic Valve: The pulmonic valve is not well visualized.  Diastolic Function: Indeterminate.  Pericardium/Pleura: No pericardial effusion visualized.  Aorta: The aortic root is normal in size.    IMPRESSION:  1. Normal left ventricular systolic function.  2. Increased left ventricular wall thickness.  3. Dilated left atrium, right atrium, right ventricle.    --- End of Report ---  BRIAN GEIGER MD; Attending Cardiologist  This document has been electronically signed. Jul 25 2023  9:58AM

## 2024-01-30 NOTE — PROGRESS NOTE ADULT - PROBLEM SELECTOR PLAN 2
Patient with history of CHF, follows with [facility]   - Asymptomatic with no signs or symptoms of volume overload  - Continue home medications: bumetanide, metoprolol   - Strict I/Os, daily weights  - Monitor and replace electrolytes
Patient with history of CHF, follows with [facility]   - Asymptomatic with no signs or symptoms of volume overload  - Continue home medications: bumetanide, metoprolol   - Strict I/Os, daily weights  - Monitor and replace electrolytes
Patient with history of CHF, follows with [facility]   - Asymptomatic with no signs or symptoms of volume overload  - Continue home medications: bumetanide, metoprolol   - Strict I/Os, daily weights  - Monitor and replace electrolytes  - Cardio clearance for OR noted

## 2024-01-30 NOTE — PROGRESS NOTE ADULT - PROBLEM SELECTOR PLAN 8
Chronic  - Continue home rosuvastatin -- interchange with atorvastatin

## 2024-01-30 NOTE — PROGRESS NOTE ADULT - TIME BILLING
Reviewing medical record including consultant recommendations, labs, vitals, medications, orders, imaging, and discussion of plan of care with patient, family, consultants, and nursing.
Reviewing medical record including consultant recommendations, labs, vitals, medications, orders, imaging, and discussion of plan of care with patient, family, consultants, and nursing.

## 2024-01-30 NOTE — PROGRESS NOTE ADULT - PROBLEM SELECTOR PLAN 4
Chronic  Remote tele monitoring  Rate control: metoprolol with hold parameters  c/w heparin while off Eliquis for possible OR
Chronic  Remote tele monitoring  Rate control: metoprolol with hold parameters  consider Lovenox while off Eliquis for possible OR
Chronic  Remote tele monitoring  Rate control: metoprolol with hold parameters  c/w heparin while off Eliquis for possible OR

## 2024-01-30 NOTE — CHART NOTE - NSCHARTNOTEFT_GEN_A_CORE
Called by RN for bleeding at incision site. Patient recently transferred from the PACU to the floors s/p ORIF of left hip with C-arm fluoroscopic guidance        T(C): 36.2 (01-30-24 @ 20:13), Max: 36.8 (01-30-24 @ 19:03)  HR: 90 (01-30-24 @ 20:13) (71 - 90)  BP: 165/88 (01-30-24 @ 20:13) (134/82 - 185/96)  RR: 18 (01-30-24 @ 20:13) (11 - 18)  SpO2: 95% (01-30-24 @ 20:13) (94% - 100%)  Wt(kg): --    Physical :  Gen- NAD, ncat  Cardio - s+1,s+2, rrr, no murmur  Lung - cta b/l, no wheeze, no rhonchi, no rales   Abdomen- +BS, NT/ND, no guarding, no rebound, no masses  Ext- no edema, 2+ pulses b/l  Neuro- CN grossly intact, strength 5/5 b/l extrem    LABS:                        15.4   13.42 )-----------( 171      ( 30 Jan 2024 21:00 )             47.4     01-30    141  |  105  |  26<H>  ----------------------------<  149<H>  4.1   |  27  |  1.10    Ca    9.4      30 Jan 2024 12:30  Phos  2.9     01-29  Mg     2.1     01-29    TPro  7.0  /  Alb  3.1<L>  /  TBili  0.7  /  DBili  x   /  AST  19  /  ALT  24  /  AlkPhos  111  01-29    PT/INR - ( 30 Jan 2024 05:22 )   PT: 11.3 sec;   INR: 0.96 ratio         PTT - ( 30 Jan 2024 05:22 )  PTT:34.4 sec  Urinalysis Basic - ( 30 Jan 2024 12:30 )    Color: x / Appearance: x / SG: x / pH: x  Gluc: 149 mg/dL / Ketone: x  / Bili: x / Urobili: x   Blood: x / Protein: x / Nitrite: x   Leuk Esterase: x / RBC: x / WBC x   Sq Epi: x / Non Sq Epi: x / Bacteria: x              Assessment/Plan  78yMale admitted for   - Called by RN for bleeding at incision site. Patient recently transferred from the PACU to the floors s/p ORIF of left hip with C-arm fluoroscopic guidance. On examination of incision site, there was noted to be an area of blood on his dressing, with minor amount of blood leakage outside of the dressing. The entire dressing is not saturated with blood. Patient is feeling well, not complaining of any pain to the area. It was noted that there was about 50cc blood loss during the procedure. VSS on exam. Patient not currently on any AC.         T(C): 36.2 (01-30-24 @ 20:13), Max: 36.8 (01-30-24 @ 19:03)  HR: 90 (01-30-24 @ 20:13) (71 - 90)  BP: 165/88 (01-30-24 @ 20:13) (134/82 - 185/96)  RR: 18 (01-30-24 @ 20:13) (11 - 18)  SpO2: 95% (01-30-24 @ 20:13) (94% - 100%)  Wt(kg): --    Physical :  Gen- NAD, ncat  Cardio - s+1,s+2, rrr, no murmur  Lung - cta b/l, no wheeze, no rhonchi, no rales   Abdomen- +BS, NT/ND, no guarding, no rebound, no masses  Ext- Covered incision site on LLE, minimal blood leakage around bandage, bandage not fully saturated with blood  Neuro- CN grossly intact, strength 5/5 b/l extrem    LABS:                        15.4   13.42 )-----------( 171      ( 30 Jan 2024 21:00 )             47.4     01-30    141  |  105  |  26<H>  ----------------------------<  149<H>  4.1   |  27  |  1.10    Ca    9.4      30 Jan 2024 12:30  Phos  2.9     01-29  Mg     2.1     01-29    TPro  7.0  /  Alb  3.1<L>  /  TBili  0.7  /  DBili  x   /  AST  19  /  ALT  24  /  AlkPhos  111  01-29    PT/INR - ( 30 Jan 2024 05:22 )   PT: 11.3 sec;   INR: 0.96 ratio         PTT - ( 30 Jan 2024 05:22 )  PTT:34.4 sec  Urinalysis Basic - ( 30 Jan 2024 12:30 )    Color: x / Appearance: x / SG: x / pH: x  Gluc: 149 mg/dL / Ketone: x  / Bili: x / Urobili: x   Blood: x / Protein: x / Nitrite: x   Leuk Esterase: x / RBC: x / WBC x   Sq Epi: x / Non Sq Epi: x / Bacteria: x              Assessment/Plan  79yo M PMH chronic atrial fibrillation on Eliquis (s/p ablation 2021, CardioMEM[facility]), CHF, COPD, DM2 on Mounjaro, Jardiance and novolog sliding scale, HTN, GERD presents to [facility] ED w/ a c/o of L hip pain. Admitted for MRI L hip planned for 1/29 and possible surgical intervention pending MR results.   - VSS  - Instructed nurse to demarcate the area of bleeding on the bandage with a marker   - Will reassess area in about 2hrs to assesses if patient is continuing to actively bleed   - Continue to monitor

## 2024-01-30 NOTE — PROGRESS NOTE ADULT - PROBLEM SELECTOR PLAN 1
Patient presents after fall affecting L hip  CT pelvis demonstrates acute minimally displaced transverse fracture through the base of left greater trochanter. No convincing CT evidence for intertrochanteric extension.  MR L Hip ordered to assess for IT extension  PT/OT ordered   WBAT given low suspicion of IT extension  Pain control as ordered   Ortho following, recs appreciated
Patient presents after fall affecting L hip  CT pelvis demonstrates acute minimally displaced transverse fracture through the base of left greater trochanter. No convincing CT evidence for intertrochanteric extension.  MR L Hip with IT extension. Today for IMN with Dr. Prado.  Pain control as ordered   Pt is medically optimized and cleared for the procedure.   Cardio clearance noted  Ortho recs appreciated
Patient presents after fall affecting L hip  CT pelvis demonstrates acute minimally displaced transverse fracture through the base of left greater trochanter. No convincing CT evidence for intertrochanteric extension.  MR L Hip ordered to assess for IT extension  PT/OT ordered   WBAT given low suspicion of IT extension  Pain control as ordered   Ortho following, recs appreciated

## 2024-01-30 NOTE — PROGRESS NOTE ADULT - PROBLEM SELECTOR PLAN 9
Patient with known hx of COPD  - Continue home medications: Trelegy, continue therapeutic interchange

## 2024-01-30 NOTE — PROGRESS NOTE ADULT - SUBJECTIVE AND OBJECTIVE BOX
SUBJECTIVE:       Pt seen and examined at bedside. No acute events overnight. Patient doing well with no complaints overall. Reports pain well-controlled with medication. No CP, SOB, N/V, F/C, new N/T.    Vital Signs (24 Hrs):  T(C): 36.6 (01-30-24 @ 05:51), Max: 36.7 (01-30-24 @ 00:34)  HR: 71 (01-30-24 @ 05:51) (71 - 86)  BP: 150/84 (01-30-24 @ 05:51) (134/81 - 150/84)  RR: 18 (01-30-24 @ 05:51) (18 - 18)  SpO2: 95% (01-30-24 @ 05:51) (93% - 95%)  Wt(kg): --    LABS:                          15.2   7.13  )-----------( 182      ( 30 Jan 2024 05:22 )             45.2     01-30    140  |  105  |  27<H>  ----------------------------<  139<H>  3.4<L>   |  28  |  1.10    Ca    9.0      30 Jan 2024 05:22  Phos  2.9     01-29  Mg     2.1     01-29    TPro  7.0  /  Alb  3.1<L>  /  TBili  0.7  /  DBili  x   /  AST  19  /  ALT  24  /  AlkPhos  111  01-29    LIVER FUNCTIONS - ( 29 Jan 2024 07:26 )  Alb: 3.1 g/dL / Pro: 7.0 g/dL / ALK PHOS: 111 U/L / ALT: 24 U/L / AST: 19 U/L / GGT: x           PT/INR - ( 30 Jan 2024 05:22 )   PT: 11.3 sec;   INR: 0.96 ratio         PTT - ( 30 Jan 2024 05:22 )  PTT:34.4 sec    Gen: NAD, Resting comfortably  LLE:  Lower extremity wounds, 2/2 lower extremity vascular issues  Skin intact at hip, no erythema or ecchymosis  Able to SLR  Mild TTP by hip, nowhere else along RLE  Motor: + EHL/FHL/TA/GSC  +SILT: SPN/DPN/Clara/Saph/Tib  + DP  Compartments soft and compressible  No calf tenderness  Pain on axial load and log roll    A/P:   78yMale with L GT fx, without evidence of IT extension    OR Today for IMN with Dr. Prado  NPO, IVF while NPO  NWB   PT/OT Daily  Please hold all SGLT2 inhibitors for diabetes, given that it is a contraindication to surgery  Hold chemical DVT ppx until POD1  Please document medical optimization  Analgesia   Ice hip as tolerated  Medical recommendations appreciated  Discussed with Dr. Prado, who is in agreement with above plan

## 2024-01-30 NOTE — BRIEF OPERATIVE NOTE - NSICDXBRIEFPROCEDURE_GEN_ALL_CORE_FT
PROCEDURES:  Open reduction and internal fixation (ORIF) of left hip with C-arm fluoroscopic guidance 30-Jan-2024 18:30:57  Tru Finney

## 2024-01-30 NOTE — PROGRESS NOTE ADULT - PROBLEM SELECTOR PLAN 3
Chronic, known history of T2DM  - Hold home meds: mounjaro, jardiance   - Low dose insulin corrective scale  - Hypoglycemia protocol, fingerstick glucose QAC&HS   - Consistent carb/DASH diet  - Per ortho, will hold all SGLT2 inhibitors for diabetes, given that it is a contraindication to surgery

## 2024-01-30 NOTE — PROGRESS NOTE ADULT - SUBJECTIVE AND OBJECTIVE BOX
Patient is a 78y old  Male who presents with a chief complaint of fall (29 Jan 2024 08:47)      INTERVAL HPI/OVERNIGHT EVENTS: Pt seen and examined bedside,, OOB to chair,  had MRI Lt hip. For OR today. has no pain, SOB or fever        MEDICATIONS  (STANDING):  atorvastatin 40 milliGRAM(s) Oral at bedtime  budesonide  80 MICROgram(s)/formoterol 4.5 MICROgram(s) Inhaler 2 Puff(s) Inhalation two times a day  buMETAnide 6 milliGRAM(s) Oral daily  buMETAnide 4 milliGRAM(s) Oral at bedtime  dextrose 5%. 1000 milliLiter(s) (50 mL/Hr) IV Continuous <Continuous>  dextrose 5%. 1000 milliLiter(s) (100 mL/Hr) IV Continuous <Continuous>  dextrose 50% Injectable 25 Gram(s) IV Push once  dextrose 50% Injectable 12.5 Gram(s) IV Push once  dextrose 50% Injectable 25 Gram(s) IV Push once  famotidine    Tablet 20 milliGRAM(s) Oral two times a day  finerenone (Kerendia) 20mg 1 Tablet(s) 1 Tablet(s) Oral daily  glucagon  Injectable 1 milliGRAM(s) IntraMuscular once  heparin   Injectable 5000 Unit(s) SubCutaneous every 8 hours  insulin lispro (ADMELOG) corrective regimen sliding scale   SubCutaneous three times a day before meals  insulin lispro (ADMELOG) corrective regimen sliding scale   SubCutaneous at bedtime  levothyroxine 175 MICROGram(s) Oral daily  metoprolol succinate ER 25 milliGRAM(s) Oral daily  tiotropium 2.5 MICROgram(s) Inhaler 2 Puff(s) Inhalation daily    MEDICATIONS  (PRN):  acetaminophen     Tablet .. 650 milliGRAM(s) Oral every 6 hours PRN Temp greater or equal to 38C (100.4F), Mild Pain (1 - 3)  dextrose Oral Gel 15 Gram(s) Oral once PRN Blood Glucose LESS THAN 70 milliGRAM(s)/deciliter  melatonin 3 milliGRAM(s) Oral at bedtime PRN Insomnia  traMADol 50 milliGRAM(s) Oral every 6 hours PRN Severe Pain (7 - 10)  traMADol 25 milliGRAM(s) Oral every 6 hours PRN Moderate Pain (4 - 6)      Allergies    No Known Allergies    Intolerances        REVIEW OF SYSTEMS:  CONSTITUTIONAL: No fever or chills  HEENT:  No headache, no sore throat  RESPIRATORY: No cough, wheezing, or shortness of breath  CARDIOVASCULAR: No chest pain, palpitations, or leg swelling  GASTROINTESTINAL: No abd pain, nausea, vomiting, or diarrhea  GENITOURINARY: No dysuria, frequency, or hematuria  NEUROLOGICAL: no focal weakness or dizziness  MUSCULOSKELETAL: no Lt hip pain    Vital Signs Last 24 Hrs  T(C): 36.6 (30 Jan 2024 05:51), Max: 36.7 (30 Jan 2024 00:34)  T(F): 97.9 (30 Jan 2024 05:51), Max: 98 (30 Jan 2024 00:34)  HR: 71 (30 Jan 2024 05:51) (71 - 86)  BP: 150/84 (30 Jan 2024 05:51) (134/81 - 150/84)  BP(mean): --  RR: 18 (30 Jan 2024 05:51) (18 - 18)  SpO2: 95% (30 Jan 2024 05:51) (93% - 95%)    Parameters below as of 30 Jan 2024 05:51  Patient On (Oxygen Delivery Method): room air        PHYSICAL EXAM:  GENERAL: NAD  HEENT:  EOMI, moist mucous membranes  CHEST/LUNG:  CTA b/l, no rales, wheezes, or rhonchi  HEART:  RRR, S1, S2  ABDOMEN:  BS+, soft, nontender, nondistended  EXTREMITIES: no edema, cyanosis, or calf tenderness  NERVOUS SYSTEM: AA&Ox3    LABS:                        15.2   7.13  )-----------( 182      ( 30 Jan 2024 05:22 )             45.2     30 Jan 2024 05:22    140    |  105    |  27     ----------------------------<  139    3.4     |  28     |  1.10     Ca    9.0        30 Jan 2024 05:22      PT/INR - ( 30 Jan 2024 05:22 )   PT: 11.3 sec;   INR: 0.96 ratio         PTT - ( 30 Jan 2024 05:22 )  PTT:34.4 sec  Urinalysis Basic - ( 30 Jan 2024 05:22 )    Color: x / Appearance: x / SG: x / pH: x  Gluc: 139 mg/dL / Ketone: x  / Bili: x / Urobili: x   Blood: x / Protein: x / Nitrite: x   Leuk Esterase: x / RBC: x / WBC x   Sq Epi: x / Non Sq Epi: x / Bacteria: x      CAPILLARY BLOOD GLUCOSE      POCT Blood Glucose.: 122 mg/dL (30 Jan 2024 06:04)  POCT Blood Glucose.: 113 mg/dL (29 Jan 2024 21:52)  POCT Blood Glucose.: 115 mg/dL (29 Jan 2024 17:06)  POCT Blood Glucose.: 135 mg/dL (29 Jan 2024 11:48)    UCx       RADIOLOGY & ADDITIONAL TESTS:            RADIOLOGY & ADDITIONAL TESTS:    Personally reviewed.     Consultant(s) Notes Reviewed:  [x] YES  [ ] NO    Care Discussed with [x] Consultants  [x] Patient  [ ] Family  [ ]      [ ] Other; RN  DVT ppx

## 2024-01-30 NOTE — PROGRESS NOTE ADULT - PROBLEM SELECTOR PROBLEM 1
Fracture of greater trochanter of left femur

## 2024-01-30 NOTE — PROGRESS NOTE ADULT - ASSESSMENT
79yo M atrial fibrillation on Eliquis (s/p ablation 2021, CardioMEMS), diastolic CHF, COPD, DM2, HTN, GERD presents to [facility] ED with left hip pain after a fall.      L GT fx, Atrial fibrillation (s/p ablation 2021, CardioMEMS), diastolic CHF, HTN   - found to have a left sided hip fracture, OR Today for IMN with Dr. Prado    - He does have cardiac history with an admission at [facility] in 2021 for volume overload requiring iv diuretics and cardiomems placement, followed by af with ablation, followed by Dr Balderas [place] now [place] he will be following with Dr Fontenot   - No cardiac contraindication to proceeding to OR if deemed necessary. Routine cardiac monitoring is recommended.  - EKG as above SR with RBBB   - No evidence of any active ischemia   - Reports a cath without obstructive cad 5 years ago.  - Continue statin     - History of HFpef with cardiac mri showing ef 58% back in 2021  - Has cardiomems without any recent issues   - Does require high dose diuretics, and has been on Bumex 6 mg in am, and 4 in pm  - Reports his legs are at baseline, on room air no orthopnea   - Monitor strict intake and output     - H/o af s/p ablation in 2021 on home Eliquis now on hold for possible surgical intervention   - Tele w/   - Continue BB   - Resume Eliquis per ortho    - Monitor and replete lytes, keep K>4, Mg>2.  - Will continue to follow.    Choco St, MS FNP, AGACNP  Nurse Practitioner- Cardiology   Please call on TEAMS         79yo M atrial fibrillation on Eliquis (s/p ablation 2021, CardioMEMS), diastolic CHF, COPD, DM2, HTN, GERD presents to [facility] ED with left hip pain after a fall.      L GT fx, Atrial fibrillation (s/p ablation 2021, CardioMEMS), diastolic CHF, HTN   - found to have a left sided hip fracture, OR Today for IMN with Dr. Prado  - He does have cardiac history with an admission at [facility] in 2021 for volume overload requiring iv diuretics and cardiomems placement, followed by af with ablation, followed by Dr Balderas who now [place] of state he will be following with Dr Fontenot   - No cardiac contraindication to proceeding to OR if deemed necessary. Routine cardiac monitoring is recommended.  - EKG as above SR with RBBB   - No evidence of any active ischemia   - Reports a cath without obstructive cad 5 years ago.  - Continue statin     - History of HFpef with cardiac mri showing ef 58% back in 2021  - Has cardiomems without any recent issues   - Does require high dose diuretics, and has been on Bumex 6 mg in am, and 4 in pm  - Reports his legs are at baseline, on room air no orthopnea   - Monitor strict intake and output     - H/o af s/p ablation in 2021 on home Eliquis now on hold for possible surgical intervention   - Tele w/ SR 70-90s, PVC, nonsustained trigeminy   - Continue BB   - Resume Eliquis per ortho  - BP stable and controlled     - Monitor and replete lytes, keep K>4, Mg>2.  - Will continue to follow.    Choco St, MS FNP, AGACNP  Nurse Practitioner- Cardiology   Please call on TEAMS 77yo M atrial fibrillation on Eliquis (s/p ablation 2021, CardioMEMS), diastolic CHF, COPD, DM2, HTN, GERD presents to [facility] ED with left hip pain after a fall. Follows Dr Pugh for cardiology.      L GT fx, Atrial fibrillation (s/p ablation 2021, CardioMEMS), diastolic CHF, HTN   - found to have a left sided hip fracture, OR Today for IMN with Dr. Prado  - He does have cardiac history with an admission at [facility] in 2021 for volume overload requiring iv diuretics and cardiomems placement, followed by af with ablation, followed by Dr Balderas who now [place] of state he will be following with Dr Fontenot   - No cardiac contraindication to proceeding to OR if deemed necessary. Routine cardiac monitoring is recommended.  - EKG as above SR with RBBB   - No evidence of any active ischemia   - Reports a cath without obstructive cad 5 years ago.  - Continue statin     - History of HFpef with cardiac mri showing ef 58% back in 2021  - Has cardiomems without any recent issues   - Does require high dose diuretics, and has been on Bumex 6 mg in am, and 4 in pm  - Reports his legs are at baseline, on room air no orthopnea   - Monitor strict intake and output     - H/o af s/p ablation in 2021 on home Eliquis now on hold for possible surgical intervention   - Tele w/ SR 70-90s, PVC, nonsustained trigeminy   - Continue BB   - Resume Eliquis per ortho  - BP stable and controlled     - Monitor and replete lytes, keep K>4, Mg>2.  - Will continue to follow.    Choco St, MS FNP, AGACNP  Nurse Practitioner- Cardiology   Please call on TEAMS

## 2024-01-30 NOTE — PROGRESS NOTE ADULT - PROBLEM SELECTOR PLAN 7
Chronic,   On Synthroid 175  mcg at home  Continue Synthroid

## 2024-01-30 NOTE — PROGRESS NOTE ADULT - PROBLEM SELECTOR PLAN 5
Parent
Patient with history of CKD, stage 3 based on previous GFR  - Creatinine currently 1.4, appears to be at baseline   - Per chart review, Cr was previously 1.5 in 07/2023  - Monitor BMP  - Takes home Kerendia, bumetanide   - Patient to bring Kerendia (filerenone) from home  - Continue home bumetanide  - Avoid nephrotoxic medications as able

## 2024-01-31 ENCOUNTER — TRANSCRIPTION ENCOUNTER (OUTPATIENT)
Age: 79
End: 2024-01-31

## 2024-01-31 VITALS
HEART RATE: 96 BPM | DIASTOLIC BLOOD PRESSURE: 69 MMHG | OXYGEN SATURATION: 93 % | SYSTOLIC BLOOD PRESSURE: 123 MMHG | TEMPERATURE: 98 F | RESPIRATION RATE: 18 BRPM

## 2024-01-31 LAB
ANION GAP SERPL CALC-SCNC: 5 MMOL/L — SIGNIFICANT CHANGE UP (ref 5–17)
BUN SERPL-MCNC: 31 MG/DL — HIGH (ref 7–23)
CALCIUM SERPL-MCNC: 9.1 MG/DL — SIGNIFICANT CHANGE UP (ref 8.5–10.1)
CHLORIDE SERPL-SCNC: 100 MMOL/L — SIGNIFICANT CHANGE UP (ref 96–108)
CO2 SERPL-SCNC: 30 MMOL/L — SIGNIFICANT CHANGE UP (ref 22–31)
CREAT SERPL-MCNC: 1.2 MG/DL — SIGNIFICANT CHANGE UP (ref 0.5–1.3)
EGFR: 63 ML/MIN/1.73M2 — SIGNIFICANT CHANGE UP
GLUCOSE SERPL-MCNC: 145 MG/DL — HIGH (ref 70–99)
HCT VFR BLD CALC: 44.4 % — SIGNIFICANT CHANGE UP (ref 39–50)
HGB BLD-MCNC: 14.6 G/DL — SIGNIFICANT CHANGE UP (ref 13–17)
MCHC RBC-ENTMCNC: 28.9 PG — SIGNIFICANT CHANGE UP (ref 27–34)
MCHC RBC-ENTMCNC: 32.9 GM/DL — SIGNIFICANT CHANGE UP (ref 32–36)
MCV RBC AUTO: 87.9 FL — SIGNIFICANT CHANGE UP (ref 80–100)
NRBC # BLD: 0 /100 WBCS — SIGNIFICANT CHANGE UP (ref 0–0)
PLATELET # BLD AUTO: 210 K/UL — SIGNIFICANT CHANGE UP (ref 150–400)
POTASSIUM SERPL-MCNC: 4 MMOL/L — SIGNIFICANT CHANGE UP (ref 3.5–5.3)
POTASSIUM SERPL-SCNC: 4 MMOL/L — SIGNIFICANT CHANGE UP (ref 3.5–5.3)
RBC # BLD: 5.05 M/UL — SIGNIFICANT CHANGE UP (ref 4.2–5.8)
RBC # FLD: 15 % — HIGH (ref 10.3–14.5)
SODIUM SERPL-SCNC: 135 MMOL/L — SIGNIFICANT CHANGE UP (ref 135–145)
WBC # BLD: 12.28 K/UL — HIGH (ref 3.8–10.5)
WBC # FLD AUTO: 12.28 K/UL — HIGH (ref 3.8–10.5)

## 2024-01-31 PROCEDURE — 99239 HOSP IP/OBS DSCHRG MGMT >30: CPT

## 2024-01-31 PROCEDURE — 83036 HEMOGLOBIN GLYCOSYLATED A1C: CPT

## 2024-01-31 PROCEDURE — 83735 ASSAY OF MAGNESIUM: CPT

## 2024-01-31 PROCEDURE — 96374 THER/PROPH/DIAG INJ IV PUSH: CPT

## 2024-01-31 PROCEDURE — 85730 THROMBOPLASTIN TIME PARTIAL: CPT

## 2024-01-31 PROCEDURE — 85610 PROTHROMBIN TIME: CPT

## 2024-01-31 PROCEDURE — 99232 SBSQ HOSP IP/OBS MODERATE 35: CPT

## 2024-01-31 PROCEDURE — 80048 BASIC METABOLIC PNL TOTAL CA: CPT

## 2024-01-31 PROCEDURE — 87637 SARSCOV2&INF A&B&RSV AMP PRB: CPT

## 2024-01-31 PROCEDURE — 85025 COMPLETE CBC W/AUTO DIFF WBC: CPT

## 2024-01-31 PROCEDURE — 80053 COMPREHEN METABOLIC PANEL: CPT

## 2024-01-31 PROCEDURE — 76000 FLUOROSCOPY <1 HR PHYS/QHP: CPT

## 2024-01-31 PROCEDURE — 76376 3D RENDER W/INTRP POSTPROCES: CPT

## 2024-01-31 PROCEDURE — 86901 BLOOD TYPING SEROLOGIC RH(D): CPT

## 2024-01-31 PROCEDURE — 97165 OT EVAL LOW COMPLEX 30 MIN: CPT

## 2024-01-31 PROCEDURE — 93005 ELECTROCARDIOGRAM TRACING: CPT

## 2024-01-31 PROCEDURE — 94640 AIRWAY INHALATION TREATMENT: CPT

## 2024-01-31 PROCEDURE — 86900 BLOOD TYPING SEROLOGIC ABO: CPT

## 2024-01-31 PROCEDURE — 85027 COMPLETE CBC AUTOMATED: CPT

## 2024-01-31 PROCEDURE — 84100 ASSAY OF PHOSPHORUS: CPT

## 2024-01-31 PROCEDURE — 86850 RBC ANTIBODY SCREEN: CPT

## 2024-01-31 PROCEDURE — 71045 X-RAY EXAM CHEST 1 VIEW: CPT

## 2024-01-31 PROCEDURE — 82962 GLUCOSE BLOOD TEST: CPT

## 2024-01-31 PROCEDURE — 72192 CT PELVIS W/O DYE: CPT | Mod: MA

## 2024-01-31 PROCEDURE — 81003 URINALYSIS AUTO W/O SCOPE: CPT

## 2024-01-31 PROCEDURE — 73721 MRI JNT OF LWR EXTRE W/O DYE: CPT

## 2024-01-31 PROCEDURE — C1713: CPT

## 2024-01-31 PROCEDURE — 36415 COLL VENOUS BLD VENIPUNCTURE: CPT

## 2024-01-31 PROCEDURE — 99285 EMERGENCY DEPT VISIT HI MDM: CPT

## 2024-01-31 PROCEDURE — 97162 PT EVAL MOD COMPLEX 30 MIN: CPT

## 2024-01-31 RX ORDER — APIXABAN 2.5 MG/1
5 TABLET, FILM COATED ORAL EVERY 12 HOURS
Refills: 0 | Status: DISCONTINUED | OUTPATIENT
Start: 2024-01-31 | End: 2024-01-31

## 2024-01-31 RX ORDER — POLYETHYLENE GLYCOL 3350 17 G/17G
17 POWDER, FOR SOLUTION ORAL
Qty: 0 | Refills: 0 | DISCHARGE
Start: 2024-01-31

## 2024-01-31 RX ORDER — TRAMADOL HYDROCHLORIDE 50 MG/1
1 TABLET ORAL
Qty: 10 | Refills: 0
Start: 2024-01-31

## 2024-01-31 RX ORDER — CEFAZOLIN SODIUM 1 G
2000 VIAL (EA) INJECTION EVERY 8 HOURS
Refills: 0 | Status: COMPLETED | OUTPATIENT
Start: 2024-01-31 | End: 2024-01-31

## 2024-01-31 RX ORDER — SENNA PLUS 8.6 MG/1
2 TABLET ORAL
Qty: 0 | Refills: 0 | DISCHARGE
Start: 2024-01-31

## 2024-01-31 RX ORDER — TRAMADOL HYDROCHLORIDE 50 MG/1
0.5 TABLET ORAL
Qty: 10 | Refills: 0
Start: 2024-01-31

## 2024-01-31 RX ORDER — ACETAMINOPHEN 500 MG
2 TABLET ORAL
Qty: 60 | Refills: 0
Start: 2024-01-31 | End: 2024-02-09

## 2024-01-31 RX ORDER — ACETAMINOPHEN 500 MG
1 TABLET ORAL
Refills: 0 | DISCHARGE

## 2024-01-31 RX ADMIN — BUMETANIDE 6 MILLIGRAM(S): 0.25 INJECTION INTRAMUSCULAR; INTRAVENOUS at 05:43

## 2024-01-31 RX ADMIN — TRAMADOL HYDROCHLORIDE 50 MILLIGRAM(S): 50 TABLET ORAL at 13:58

## 2024-01-31 RX ADMIN — TRAMADOL HYDROCHLORIDE 50 MILLIGRAM(S): 50 TABLET ORAL at 12:53

## 2024-01-31 RX ADMIN — Medication 100 MILLIGRAM(S): at 11:38

## 2024-01-31 RX ADMIN — Medication 175 MICROGRAM(S): at 05:44

## 2024-01-31 RX ADMIN — TIOTROPIUM BROMIDE 2 PUFF(S): 18 CAPSULE ORAL; RESPIRATORY (INHALATION) at 07:48

## 2024-01-31 RX ADMIN — TRAMADOL HYDROCHLORIDE 50 MILLIGRAM(S): 50 TABLET ORAL at 06:27

## 2024-01-31 RX ADMIN — Medication 1: at 16:44

## 2024-01-31 RX ADMIN — HEPARIN SODIUM 5000 UNIT(S): 5000 INJECTION INTRAVENOUS; SUBCUTANEOUS at 05:44

## 2024-01-31 RX ADMIN — Medication 2: at 11:49

## 2024-01-31 RX ADMIN — Medication 25 MILLIGRAM(S): at 05:42

## 2024-01-31 RX ADMIN — BUDESONIDE AND FORMOTEROL FUMARATE DIHYDRATE 2 PUFF(S): 160; 4.5 AEROSOL RESPIRATORY (INHALATION) at 07:48

## 2024-01-31 NOTE — PROGRESS NOTE ADULT - SUBJECTIVE AND OBJECTIVE BOX
Postop Check    Patient tolerated the procedure well. Patient seen and examined at bedside.  No acute complaints at this time. Pain well controlled. Denies chest pain, shortness of breath, nausea or vomiting.     PE:  Vital Signs Last 24 Hrs  T(C): 36.2 (01-31-24 @ 06:27), Max: 36.8 (01-30-24 @ 19:03)  T(F): 97.1 (01-31-24 @ 06:27), Max: 98.2 (01-30-24 @ 19:03)  HR: 90 (01-31-24 @ 06:27) (78 - 90)  BP: 167/82 (01-31-24 @ 06:27) (134/82 - 185/96)  BP(mean): --  RR: 18 (01-31-24 @ 06:27) (11 - 18)  SpO2: 95% (01-31-24 @ 06:27) (94% - 100%)    General: NAD, resting comfortably in bed  LLE:   Dressing in place C/D/I, slightly saturated but not soaked  SCD in place bilaterally  No calf tenderness   Motor: + EHL/FHL/TA/GSC  +SILT: SPN/DPN/Clara/Saph/Tib  DP/PT 2+      A/P:  78y m s/p L IMN POD 0  -PT/OT  -WBAT on the LLE  -Pain Control  -DVT ppx per primary medicine team, is on heparin   -Continue perioperative abx x 24 hours  -FU AM Labs  -Rest, ice, compress and elevate the extremity as we needed  -Incentive Spirometry  -Medical management appreciated  -Dispo pending PT eval  -Discussed above with Dr. Prado, who agrees with plan

## 2024-01-31 NOTE — PROGRESS NOTE ADULT - NS ATTEND AMEND GEN_ALL_CORE FT
77yo M atrial fibrillation on Eliquis (s/p ablation 2021, CardioMEMS), diastolic CHF, COPD, DM2, HTN, GERD presents to [facility] ED with left hip pain after a fall. Follows Dr Pugh for cardiology.     hip fx, s/p imn 1/30  no cv complications postop  af ablated, in sr  resume ac  hx of hfpef, cont diretics
77yo M atrial fibrillation on Eliquis (s/p ablation 2021, CardioMEMS), diastolic CHF, COPD, DM2, HTN, GERD presents to \[facility]\"" ED with left hip pain after a fall.     No signs of significant ischemia or volume overload on my exam today.     - found to have a left sided hip fracture and now plan for surgery.  - does have cardiac history with an admission at [facility] in 2021 for volume overload requiring iv diuretics and cardiomems placement, followed by af with ablation, followed by Dr Balderas who now relocated out of state he will be following with Dr Fontenot     - EKG as above SR with RBBB   - no sign of acute ischemia  - reports a cath without obstructive cad 5 years ago.  - cont with statin    - history of HFpef with cardiac mri showing ef 58% back in 2021  - has cardiomems without any recent issues   - Appears compensated from HF POV.   - does require high dose diuretics, and has been on bumex 6 mg in am, and 4 in pm  - Please continue to maintain strict I/Os, monitor daily weights, Cr, and K.     - history of af s/p ablation in 2021 on home eliquis now on hold for possible surgical intervention   - tele revealing Sr no events overnight   - cont bb  - ac on hold in preparation for possible surgery    - no cardiac contraindication to proceeding to OR. Routine cardiac monitoring is recommended.
79yo M atrial fibrillation on Eliquis (s/p ablation 2021, CardioMEMS), diastolic CHF, COPD, DM2, HTN, GERD presents to [facility] ED with left hip pain after a fall.     No signs of significant ischemia or volume overload on my exam today.     - found to have a left sided hip fracture, and surgery is being considered, pending a MRI today- ok to proceed with MRI   - does have cardiac history with an admission at [facility] in 2021 for volume overload requiring iv diuretics and cardiomems placement, followed by af with ablation, followed by Dr Balderas who now [place] out of state he will be following with Dr Fontenot     -EKG as above SR with RBBB   - no sign of acute ischemia  -reports a cath without obstructive cad 5 years ago.  - cont with statin    - history of HFpef with cardiac mri showing ef 58% back in 2021  - has cardiomems without any recent issues   - Appears compensated from HF POV.   - does require high dose diuretics, and has been on bumex 6 mg in am, and 4 in pm  - Please continue to maintain strict I/Os, monitor daily weights, Cr, and K.     - history of af s/p ablation in 2021 on home eliquis now on hold for possible surgical intervention   - tele revealing Sr no events overnight   - cont bb  - ac on hold in preparation for possible surgery    - no cardiac contraindication to proceeding to OR if deemed necessary. Routine cardiac monitoring is recommended.

## 2024-01-31 NOTE — DISCHARGE NOTE NURSING/CASE MANAGEMENT/SOCIAL WORK - PATIENT PORTAL LINK FT
You can access the FollowMyHealth Patient Portal offered by [facility] by registering at the following website: [email or web address]. By joining [facility] FollowMyHealth portal, you will also be able to view your health information using other applications (apps) compatible with our system.

## 2024-01-31 NOTE — SOCIAL WORK PROGRESS NOTE - NSSWPROGRESSNOTE_GEN_ALL_CORE
Per medical team, pt cleared for dc. Bed confirmed at [facility], [facility] pickup scheduled for 2:30pm. Pt aware and in agreement with plan.

## 2024-01-31 NOTE — DISCHARGE NOTE PROVIDER - CARE PROVIDER_API CALL
Hugo Prado  Orthopaedic Surgery  [address] [place] [address]  Phone: [phone]  Fax: [phone]  Follow Up Time: 1 month   Hugo Prado  Orthopaedic Surgery  [address] [place] [address]  Phone: [phone]  Fax: [phone]  Follow Up Time: 2 weeks    Kashmir Mclaughlin  Internal Medicine  [address] [facility] [facility] [address]  [place] [address]  Phone: [phone]  Fax: [phone]  Follow Up Time: 1 week    your cardiologist,   Phone: (   )    -  Fax: (   )    -  Follow Up Time: Routine

## 2024-01-31 NOTE — PHYSICAL THERAPY INITIAL EVALUATION ADULT - ADDITIONAL COMMENTS
Patient reports living in private home with wife and dtr with 3 steps to enter and flight of stairs inside house. Patient reports being independent with no assistive device for all functional mobility and ADLs. Of note patient owns a single axis cane he utilizes if he is going on vacation and knows he will be ambulating a lot.

## 2024-01-31 NOTE — OCCUPATIONAL THERAPY INITIAL EVALUATION ADULT - DIAGNOSIS, OT EVAL
Pt with impaired strength, balance, and ROM in L hip impacting ability to complete ADLs, IADLs, functional mobility/transfers.

## 2024-01-31 NOTE — DISCHARGE NOTE NURSING/CASE MANAGEMENT/SOCIAL WORK - NSDCPEFALRISK_GEN_ALL_CORE
For information on Fall & Injury Prevention, visit: [email or web address] OR  [email or web address] OR  https://www.cdc.gov/steadi/patient.html

## 2024-01-31 NOTE — OCCUPATIONAL THERAPY INITIAL EVALUATION ADULT - ADL RETRAINING, OT EVAL
Pt will complete LB dressing, with AE as needed, with modified independence by 2-5 sessions. Pt will complete grooming activity while standing at the sink with supervision by 2-5 sessions.

## 2024-01-31 NOTE — OCCUPATIONAL THERAPY INITIAL EVALUATION ADULT - PERTINENT HX OF CURRENT PROBLEM, REHAB EVAL
As per H&P: "77yo M PMH chronic atrial fibrillation on Eliquis (s/p ablation 2021, CardioMEMS), CHF, COPD, DM2 on Mounjaro, Jardiance and novolog sliding scale, HTN, GERD presents to [facility] ED w/ a c/o of L hip pain. Pt states that he fell on1/24/2024 Wednesday but was able to walk after the fall. Patient describes he fell backwards while attempting to hold the door and landed on his left hip. Denies headstrike, loss of consciousness. Patient did not feel well enough to go to work on Thursday. On Friday, patient  reports he could not put any weight on his L leg. Pt states that he fell again on Friday 1/26/2024, landing on his rear and since then patient has been able to stand but with difficulty and has not been able to walk. Patient saw Dr. Prado in the office n Friday 1/24 and was instructed to come to ED today. Denies any numbness or tingling. Denies having any other pain elsewhere. Previous R shoulder TSA. Patient was BIBEMS to [facility] hospital. At rest, patient denies pain. Admits to pain solely with movement and weight bearing."   1/30/24: Pt s/p ORIF of Left hip

## 2024-01-31 NOTE — PROGRESS NOTE ADULT - PROVIDER SPECIALTY LIST ADULT
Cardiology
Orthopedics
Cardiology
Orthopedics
Cardiology
Hospitalist

## 2024-01-31 NOTE — DISCHARGE NOTE PROVIDER - PROVIDER TOKENS
PROVIDER:[TOKEN:[[id number]],FOLLOWUP:[1 month]] PROVIDER:[TOKEN:[[id number]],FOLLOWUP:[2 weeks]],PROVIDER:[TOKEN:[[id number]],FOLLOWUP:[1 week]],FREE:[LAST:[your cardiologist],PHONE:[(   )    -],FAX:[(   )    -],FOLLOWUP:[Routine]]

## 2024-01-31 NOTE — SOCIAL WORK PROGRESS NOTE - NSSWPROGRESSNOTE_GEN_ALL_CORE
Per IDT rounds, pt post op day 1 (INM), per medical team will be cleared for dc to [facility] tomorrow 2/1/24. Bed confirmed at [facility] (first choice), pt updated. Pt on IBX Ancef, awaiting PTE. SW to remain available and to continue to follow up with medical team. Pt aware and in agreement with plan.

## 2024-01-31 NOTE — DISCHARGE NOTE PROVIDER - NSDCFUADDINST_GEN_ALL_CORE_FT
[facility] DC Instructions:    1. ACTIVITY: Weight bearing as tolerated with assistive devices (i.e. cane/walker).  2. DVT/PE Prophylaxis: Continue anticoagulation medication per medicine team. See Med Rec for duration and dose.  3. PHYSICAL THERAPY: You should receive physical therapy daily.   4. STAPLES: Your staples are to be removed on post-operative day #21 (2/21/2024)  5. BANDAGE: Change bandage on POD7 to dry gauze and tegaderm or paper tape. Can also change PRN if saturated. Do NOT remove on arrival to inspect wound.  6. FOLLOW UP: Follow up outpatient with your Orthopedic Surgeon Dr. Prado in 1 month. Perform portable xray of extremity at [facility] postoperative day #14 before follow up.

## 2024-01-31 NOTE — DISCHARGE NOTE PROVIDER - HOSPITAL COURSE
ADMISSION DATE:  01-27-24    ---  FROM ADMISSION H+P:   HPI:  79yo M PMH chronic atrial fibrillation on Eliquis (s/p ablation 2021, CardioMEMS), CHF, COPD, DM2 on Mounjaro, Jardiance and novolog sliding scale, HTN, GERD presents to [facility] ED w/ a c/o of L hip pain. Pt states that he fell on1/24/2024 Wednesday but was able to walk after the fall. Patient describes he fell backwards while attempting to hold the door and landed on his left hip. Denies headstrike, loss of consciousness. Patient did not feel well enough to go to work on Thursday. On Friday, patient  reports he could not put any weight on his L leg. Pt states that he fell again on Friday 1/26/2024, landing on his rear and since then patient has been able to stand but with difficulty and has not been able to walk. Patient saw Dr. Prado in the office n Friday 1/24 and was instructed to come to ED today. Denies any numbness or tingling. Denies having any other pain elsewhere. Previous R shoulder TSA. Patient was BIBEMS to [facility] hospital. At rest, patient denies pain. Admits to pain solely with movement and weight bearing.     ED Course:   Vitals: BP: 144/79 --> 137/85, HR: 83, Temp: 98.1F, RR: 16, SpO2: 97% on RA  Labs: H/H 15.2/45.7 within normal; K low 3.4, BUN elevated 33, Creatinine elevated 1.4  UA: glucose >1000, negative nitrites, negative LE, negative ketones, trace protein, negative blood  CT pelvis:   Osseous: Acute minimally displaced transverse fracture through the base   of left greater trochanter. No convincing CT evidence for   intertrochanteric extension. Moderate bilateral hip arthrosis without   significant effusions. Sacroiliac joints and pubic symphysis are   maintained. Advanced lower lumbar discogenic spondylosis, otherwise   incompletely evaluated. No aggressive osseous neoplasm.  Soft tissues: No sizable hyperattenuating hematoma or drainable   encapsulated fluid collection. No pelvic free fluid.    EKG: Sinus rhythm with 1st degree AV block with occasional premature ventricular complexes, VR 79  Received in the ED   NS bolus 1L x1, Ofirmev x1 (27 Jan 2024 18:06)      ---  HOSPITAL COURSE/PERTINENT LABS/PROCEDURES PERFORMED/PENDING TESTS:   Pt was admitted for Fracture of greater trochanter of left femur.   CT pelvis demonstrates acute minimally displaced transverse fracture through the base of left greater trochanter. No convincing CT evidence for intertrochanteric extension.  MR L Hip with IT extension.  now s/p ORIF 1/30 after patient was cardio/medically cleared, doing well post op, labs /vital stable, able to ambulate with PT. patient was seen by cardiology.       Patient is stable for discharge as per primary medical team and consultants.    PT consulted, recommends discharge ______SAR     Patient showed improvement throughout hospitalization. Patient was seen and examined on day of discharge. Patient was medically optimized for discharge with close outpatient follow up.    ---  PATIENT CONDITION:  - stable    --  VITALS:   T(C): 36.7 (01-31-24 @ 12:28), Max: 36.8 (01-30-24 @ 19:03)  HR: 96 (01-31-24 @ 12:28) (78 - 104)  BP: 123/69 (01-31-24 @ 12:28) (116/71 - 185/96)  RR: 18 (01-31-24 @ 12:28) (11 - 18)  SpO2: 93% (01-31-24 @ 12:28) (93% - 100%)    PHYSICAL EXAM ON DAY OF DISCHARGE:  GENERAL: NAD  HEENT:  EOMI, moist mucous membranes  CHEST/LUNG:  CTA b/l, no rales, wheezes, or rhonchi  HEART:  RRR, S1, S2  ABDOMEN:  BS+, soft, nontender, nondistended  EXTREMITIES: no edema, cyanosis, or calf tenderness, Left hip Sx site clean, ROM limited due to Sx but present   NERVOUS SYSTEM: AA&Ox3    ---  CONSULTANTS:   cardiology   orthopedic

## 2024-01-31 NOTE — PROGRESS NOTE ADULT - SUBJECTIVE AND OBJECTIVE BOX
[facility] -- Leyla Garcia, Ted Brand Savella, Xavier Bashir: Office # [phone]    Follow Up: Cardiac optimization, HTN     Subjective/Observations: Patient awake, alert, OOB to chair.  Denies chest pain, palpitations and dizziness. Denies any difficulty breathing. No orthopnea and PND. Tolerating room air. S/p OR. Tolerated procedure well, cardiac status optimal post operatively. + hip pain.     REVIEW OF SYSTEMS: All other review of systems are negative unless indicated above    PAST MEDICAL & SURGICAL HISTORY:  HTN (hypertension)      DM2 (diabetes mellitus, type 2)      Chronic atrial fibrillation      Congestive heart disease      COPD (chronic obstructive pulmonary disease)      Hypothyroidism      Hypothyroidism      History of prior ablation treatment      H/O rhinoplasty      H/O shoulder surgery      History of tonsillectomy      History of appendectomy          MEDICATIONS  (STANDING):  atorvastatin 40 milliGRAM(s) Oral at bedtime  budesonide  80 MICROgram(s)/formoterol 4.5 MICROgram(s) Inhaler 2 Puff(s) Inhalation two times a day  buMETAnide 4 milliGRAM(s) Oral at bedtime  buMETAnide 6 milliGRAM(s) Oral daily  ceFAZolin   IVPB 2000 milliGRAM(s) IV Intermittent once  dextrose 5%. 1000 milliLiter(s) (50 mL/Hr) IV Continuous <Continuous>  dextrose 50% Injectable 25 Gram(s) IV Push once  glucagon  Injectable 1 milliGRAM(s) IntraMuscular once  heparin   Injectable 5000 Unit(s) SubCutaneous every 8 hours  insulin lispro (ADMELOG) corrective regimen sliding scale   SubCutaneous three times a day before meals  lactated ringers. 1000 milliLiter(s) (75 mL/Hr) IV Continuous <Continuous>  levothyroxine 175 MICROGram(s) Oral daily  metoprolol succinate ER 25 milliGRAM(s) Oral daily  polyethylene glycol 3350 17 Gram(s) Oral at bedtime  senna 2 Tablet(s) Oral at bedtime  tiotropium 2.5 MICROgram(s) Inhaler 2 Puff(s) Inhalation daily    MEDICATIONS  (PRN):  dextrose Oral Gel 15 Gram(s) Oral once PRN Blood Glucose LESS THAN 70 milliGRAM(s)/deciliter  HYDROmorphone  Injectable 0.5 milliGRAM(s) IV Push every 3 hours PRN Breakthrough pain  magnesium hydroxide Suspension 30 milliLiter(s) Oral daily PRN Constipation  oxyCODONE    IR 5 milliGRAM(s) Oral every 3 hours PRN Moderate Pain (4 - 6)  oxyCODONE    IR 10 milliGRAM(s) Oral every 3 hours PRN Severe Pain (7 - 10)  traMADol 50 milliGRAM(s) Oral every 6 hours PRN Mild Pain (1 - 3)  trimethobenzamide Injectable 200 milliGRAM(s) IntraMuscular three times a day PRN Nausea and/or Vomiting    Allergies    No Known Allergies    Intolerances      Vital Signs Last 24 Hrs  T(C): 36.2 (31 Jan 2024 06:27), Max: 36.8 (30 Jan 2024 19:03)  T(F): 97.1 (31 Jan 2024 06:27), Max: 98.2 (30 Jan 2024 19:03)  HR: 90 (31 Jan 2024 06:27) (78 - 90)  BP: 167/82 (31 Jan 2024 06:27) (134/82 - 185/96)  BP(mean): --  RR: 18 (31 Jan 2024 06:27) (11 - 18)  SpO2: 95% (31 Jan 2024 06:27) (94% - 100%)    Parameters below as of 31 Jan 2024 06:27  Patient On (Oxygen Delivery Method): room air      I&O's Summary    30 Jan 2024 07:01  -  31 Jan 2024 07:00  --------------------------------------------------------  IN: 870 mL / OUT: 2000 mL / NET: -1130 mL      Weight (kg): 106.1 (01-30 @ 15:35)    TELE: SR 80s, PVC  PHYSICAL EXAM:  Constitutional: NAD, awake and alert  HEENT: Moist Mucous Membranes, Anicteric  Pulmonary: Non-labored, breath sounds are clear bilaterally, Diminished at bases No wheezing, rales or rhonchi  Cardiovascular: Regular, S1 and S2, No murmurs, No rubs, gallops or clicks  Gastrointestinal:  soft, nontender, nondistended   Lymph: +1 peripheral edema. No lymphadenopathy.   Skin: No visible rashes or ulcers. Left hip dressing intact.   Psych:  Mood & affect appropriate      LABS: All Labs Reviewed:                        14.6   12.28 )-----------( 210      ( 31 Jan 2024 05:57 )             44.4                         15.4   13.42 )-----------( 171      ( 30 Jan 2024 21:00 )             47.4                         15.2   7.13  )-----------( 182      ( 30 Jan 2024 05:22 )             45.2     31 Jan 2024 05:57    135    |  100    |  31     ----------------------------<  145    4.0     |  30     |  1.20   30 Jan 2024 12:30    141    |  105    |  26     ----------------------------<  149    4.1     |  27     |  1.10   30 Jan 2024 05:22    140    |  105    |  27     ----------------------------<  139    3.4     |  28     |  1.10     Ca    9.1        31 Jan 2024 05:57  Ca    9.4        30 Jan 2024 12:30  Ca    9.0        30 Jan 2024 05:22  Phos  2.9       29 Jan 2024 07:26  Mg     2.1       29 Jan 2024 07:26    TPro  7.0    /  Alb  3.1    /  TBili  0.7    /  DBili  x      /  AST  19     /  ALT  24     /  AlkPhos  111    29 Jan 2024 07:26     PT/INR - ( 30 Jan 2024 05:22 )   PT: 11.3 sec;   INR: 0.96 ratio         PTT - ( 30 Jan 2024 05:22 )  PTT:34.4 sec  12 Lead ECG:   Ventricular Rate 79 BPM    Atrial Rate 79 BPM    P-R Interval 288 ms    QRS Duration 164 ms    Q-T Interval 450 ms    QTC Calculation(Bazett) 516 ms    P Axis 65 degrees    R Axis -10 degrees    T Axis -11 degrees    Diagnosis Line Sinus rhythm with 1st degree AV block with occasional premature ventricular complexes  Right bundle branch block  Abnormal ECG  No previous ECGs available  Confirmed by enrique Woodruff ([id number]) on 1/28/2024 11:54:45 AM (01-27-24 @ 19:49)      ACC: [id number] EXAM:  US TTE W DOPPLER COMPLETE   ORDERED BY: NASIR TERESA     PROCEDURE DATE:  07/25/2023          INTERPRETATION:  Ordering Physician: NASIR TERESA [id number]    Indication: Syncope    Technician: JOHN    Study Quality: Technically difficult  A complete echocardiographic study was performed utilizing standard   protocol including spectral and color Doppler in all echocardiographic   windows.    Height: 180 cm  Weight: 106 kg  BSA: 2.25 m2  Blood Pressure: 110/60 mmHg    MEASUREMENTS  IVS: 1.5 cm  PWT: 1.4 cm  LA: 4.2 cm  AO: 4.0 cm  LVIDd: 4.2 cm  LVIDs: 3.5 cm    LVEF: 60%  RVSP: 18 mmHg  RA Pressure: 3 mmHg    FINDINGS  Left Ventricle: The left ventricle is normal in size. Increased left   ventricular wall thickness. The left ventricular systolic function is not   well visualized but appears grossly normal with an estimated EF of 60%.  Right Ventricle: The right ventricle is dilated with normal function.  Left Atrium: The left atrium is dilated.  Right Atrium: Theright atrium is dilated.  Mitral Valve: Mitral annular calcification. Trace mitral regurgitation.  Aortic Valve: The aortic valve is structurally normal. No aortic   regurgitation.  Tricuspid Valve: The tricuspid valve is structurally normal. Trace   tricuspid regurgitation. Estimated pulmonary artery systolic pressure is   18 mmHg.  Pulmonic Valve: The pulmonic valve is not well visualized.  Diastolic Function: Indeterminate.  Pericardium/Pleura: No pericardial effusion visualized.  Aorta: The aortic root is normal in size.    IMPRESSION:  1. Normal left ventricular systolic function.  2. Increased left ventricular wall thickness.  3. Dilated left atrium, right atrium, right ventricle.    --- End of Report ---    BRIAN GEIGER MD; Attending Cardiologist  This document has been electronically signed. Jul 25 2023  9:58AM

## 2024-01-31 NOTE — DISCHARGE NOTE PROVIDER - CARE PROVIDERS DIRECT ADDRESSES
[email or web address]

## 2024-01-31 NOTE — OCCUPATIONAL THERAPY INITIAL EVALUATION ADULT - RANGE OF MOTION EXAMINATION, UPPER EXTREMITY
RUE AROM 0-110 2* previous shoulder replacement. RUE Full PROM/bilateral UE Active ROM was WFL  (within functional limits)

## 2024-01-31 NOTE — PROGRESS NOTE ADULT - ASSESSMENT
79yo M atrial fibrillation on Eliquis (s/p ablation 2021, CardioMEMS), diastolic CHF, COPD, DM2, HTN, GERD presents to [facility] ED with left hip pain after a fall. Follows Dr Pugh for cardiology.      L GT fx, Atrial fibrillation (s/p ablation 2021, CardioMEMS), diastolic CHF, HTN   - found to have a left sided hip fracture, s/p IMN 1/30  - Tolerated procedure well, cardiac status optimal post operatively     - EKG as above SR with RBBB   - No evidence of any active ischemia   - Reports a cath without obstructive cad 5 years ago.  - Continue statin     - He does have cardiac history with an admission at [facility] in 2021 for volume overload requiring iv diuretics and cardiomems placement, followed by af with ablation, followed by Dr Balderas [place] he will be following with Dr Fontenot   - History of HFpef with cardiac mri showing ef 58% back in 2021  - Has cardiomems without any recent issues   - Does require high dose diuretics, and has been on Bumex 6 mg in am, and 4 in pm  - Reports his legs are at baseline, on room air no orthopnea   - Monitor strict intake and output     - H/o af s/p ablation in 2021 on home Eliquis now on hold for possible surgical intervention   - Tele w/ SR 80s, PVC, if no events, can d/c in am   - Continue BB   - Resume Eliquis per ortho  - BP stable and controlled 130-160s    - Monitor and replete lytes, keep K>4, Mg>2.  - Will continue to follow.    Choco St, MS FNP, AGACNP  Nurse Practitioner- Cardiology   Please call on TEAMS

## 2024-01-31 NOTE — OCCUPATIONAL THERAPY INITIAL EVALUATION ADULT - ADDITIONAL COMMENTS
Pt lives in a private home with his wife and daughter with 3 ELISHA and a full flight upstairs to bed/bath and a full flight downstairs to basement. Pt reports he will need to navigate the flight of stairs upstairs at d/c. Pt has a walk-in stall shower and reports he was independent with all ADLs PTA. Pt reports he no longer drives, and was using a cane for ambulation for longer distances. Pt lives in a private home with his wife and daughter with 3 ELISHA and a full flight upstairs to bed/bath and a full flight downstairs to basement. Pt reports he will need to navigate the flight of stairs upstairs at d/c. Pt has a walk-in stall shower and reports he was independent with all ADLs PTA. Pt reports he no longer drives, and was using a cane for ambulation for longer distances.  Pt completed functional mobility +RW to bathroom with CGA with mild c/o dizziness.

## 2024-01-31 NOTE — PHYSICAL THERAPY INITIAL EVALUATION ADULT - RANGE OF MOTION EXAMINATION, REHAB EVAL
LLE limited secondary pain/bilateral upper extremity ROM was WFL (within functional limits)/Right LE ROM was WFL (within functional limits)

## 2024-01-31 NOTE — DISCHARGE NOTE PROVIDER - NSDCMRMEDTOKEN_GEN_ALL_CORE_FT
acetaminophen 500 mg oral tablet: 1 tab(s) orally every 6 hours as needed for  pain  bumetanide 2 mg oral tablet: 1 tab(s) orally 2 times a day Take 3 tablets in AM (6mg) and 2 tablets in PM (4mg)  cholecalciferol 25 mcg (1000 intl units) oral tablet: 1 tab(s) orally once a day  Crestor 10 mg oral tablet: 1 tab(s) orally once a day  Eliquis 5 mg oral tablet: 1 tab(s) orally 2 times a day  Jardiance 25 mg oral tablet: 1 tab(s) orally once a day  Kerendia 20 mg oral tablet: 1 tab(s) orally once a day  levothyroxine 175 mcg (0.175 mg) oral tablet: 1 tab(s) orally once a day  metoprolol succinate 25 mg oral capsule, extended release: 1 cap(s) orally 2 times a day  Mounjaro 10 mg/0.5 mL subcutaneous solution: 10 milligram(s) subcutaneously once a week  Mucinex Fast-Max Cold and Flu oral tablet: 1 tab(s) orally once a day as needed for congestion  NovoLOG 100 units/mL injectable solution: injectable sliding scale  Pepcid 20 mg oral tablet: 1 tab(s) orally 2 times a day  Trelegy Ellipta 100 mcg-62.5 mcg-25 mcg/inh inhalation powder: 1 puff(s) inhaled once a day   acetaminophen 500 mg oral tablet: 2 tab(s) orally every 8 hours as needed for  mild pain  bumetanide 2 mg oral tablet: 1 tab(s) orally 2 times a day Take 3 tablets in AM (6mg) and 2 tablets in PM (4mg)  cholecalciferol 25 mcg (1000 intl units) oral tablet: 1 tab(s) orally once a day  Crestor 10 mg oral tablet: 1 tab(s) orally once a day  Eliquis 5 mg oral tablet: 1 tab(s) orally 2 times a day  Jardiance 25 mg oral tablet: 1 tab(s) orally once a day  Kerendia 20 mg oral tablet: 1 tab(s) orally once a day  levothyroxine 175 mcg (0.175 mg) oral tablet: 1 tab(s) orally once a day  metoprolol succinate 25 mg oral capsule, extended release: 1 cap(s) orally 2 times a day  Mounjaro 10 mg/0.5 mL subcutaneous solution: 10 milligram(s) subcutaneously once a week  NovoLOG 100 units/mL injectable solution: injectable sliding scale  Pepcid 20 mg oral tablet: 1 tab(s) orally 2 times a day  polyethylene glycol 3350 oral powder for reconstitution: 17 gram(s) orally once a day (at bedtime)  senna leaf extract oral tablet: 2 tab(s) orally once a day (at bedtime)  traMADol 50 mg oral tablet: 1 tab(s) orally every 6 hours as needed for severe Pain MDD: 4 tablets  traMADol 50 mg oral tablet: 0.5 tab(s) orally every 6 hours as needed for  moderate pain MDD: 4 tablets  Trelegy Ellipta 100 mcg-62.5 mcg-25 mcg/inh inhalation powder: 1 puff(s) inhaled once a day

## 2024-01-31 NOTE — PHYSICAL THERAPY INITIAL EVALUATION ADULT - PERTINENT HX OF CURRENT PROBLEM, REHAB EVAL
77yo M PMH chronic atrial fibrillation on Eliquis (s/p ablation 2021, CardioMEMS), CHF, COPD, DM2 on Mounjaro, Jardiance and novolog sliding scale, HTN, GERD presents to [facility] ED w/ a c/o of L hip pain. Pt states that he fell on1/24/2024 Wednesday but was able to walk after the fall. Patient describes he fell backwards while attempting to hold the door and landed on his left hip. Denies headstrike, loss of consciousness. Patient did not feel well enough to go to work on Thursday. On Friday, patient  reports he could not put any weight on his L leg. Pt states that he fell again on Friday 1/26/2024, landing on his rear and since then patient has been able to stand but with difficulty and has not been able to walk. Patient saw Dr. Prado in the office n Friday 1/24 and was instructed to come to ED today. Denies any numbness or tingling. Denies having any other pain elsewhere. Previous R shoulder TSA. Patient was BIBEMS to [facility] hospital. At rest, patient denies pain. Admits to pain solely with movement and weight bearing.

## 2024-01-31 NOTE — DISCHARGE NOTE PROVIDER - NSDCCPCAREPLAN_GEN_ALL_CORE_FT
PRINCIPAL DISCHARGE DIAGNOSIS  Diagnosis: Greater trochanter fracture  Assessment and Plan of Treatment: left , S/P ORIF 1/30/24. PT/*OT, painmanagement, fall precaution, follow up orthopedic out patient      SECONDARY DISCHARGE DIAGNOSES  Diagnosis: Congestive heart disease  Assessment and Plan of Treatment: continue current management, follow up cardiology as routine    Diagnosis: Chronic atrial fibrillation  Assessment and Plan of Treatment: continue current management, follow up cardiology as routine

## 2024-02-01 DIAGNOSIS — S72.114A NONDISPLACED FRACTURE OF GREATER TROCHANTER OF RIGHT FEMUR, INITIAL ENCOUNTER FOR CLOSED FRACTURE: ICD-10-CM

## 2024-02-01 RX ORDER — TRAMADOL HYDROCHLORIDE 50 MG/1
50 TABLET, COATED ORAL
Qty: 60 | Refills: 0 | Status: ACTIVE | COMMUNITY
Start: 2024-02-01 | End: 1900-01-01

## 2024-06-02 NOTE — ED PROVIDER NOTE - PHYSICAL EXAMINATION
Patient Left Hip- Patient able to internally and externally rotate extremity. Able to perform flexion and extension of leg.

## 2024-10-28 ENCOUNTER — NON-APPOINTMENT (OUTPATIENT)
Age: 79
End: 2024-10-28

## 2025-04-10 NOTE — OCCUPATIONAL THERAPY INITIAL EVALUATION ADULT - TRANSFER SAFETY CONCERNS NOTED: SIT/STAND, REHAB EVAL
Left VM and went over the instructions for pt's Nuclear Stress Test.  Reminded pt of echo at 0930.    decreased weight-shifting ability

## 2025-04-11 ENCOUNTER — NON-APPOINTMENT (OUTPATIENT)
Age: 80
End: 2025-04-11

## (undated) DEVICE — SUT POLYSORB 2-0 30" GS-11 UNDYED

## (undated) DEVICE — GLV 7.5 PROTEXIS (WHITE)

## (undated) DEVICE — DRAPE C ARM UNIVERSAL

## (undated) DEVICE — PLV-SCD MACHINE: Type: DURABLE MEDICAL EQUIPMENT

## (undated) DEVICE — GLV 8 PROTEXIS (WHITE)

## (undated) DEVICE — PLV-STRYKER GAMMA 3 TARGETING DEVICE: Type: DURABLE MEDICAL EQUIPMENT

## (undated) DEVICE — WARMING BLANKET UPPER ADULT

## (undated) DEVICE — PLV-STRYKER SYSTEM 8: Type: DURABLE MEDICAL EQUIPMENT

## (undated) DEVICE — DRILL BIT SYNTHES ORTHO CALIBRATED 4.2MM X 330MM

## (undated) DEVICE — SUT POLYSORB 0 30" GS-12 UNDYED

## (undated) DEVICE — ELCTR GROUNDING PAD ADULT COVIDIEN

## (undated) DEVICE — VENODYNE/SCD SLEEVE CALF LARGE

## (undated) DEVICE — VENODYNE/SCD SLEEVE CALF MEDIUM

## (undated) DEVICE — PACK HIP FRACTURE

## (undated) DEVICE — DRSG COMBINE 5X9"

## (undated) DEVICE — GUIDEWIRE SYNTHES 3.2MM X 400MM

## (undated) DEVICE — DRSG COBAN 6"

## (undated) DEVICE — PLV-CONSIGN STRYKER GAMMA OPTIONAL INST: Type: DURABLE MEDICAL EQUIPMENT

## (undated) DEVICE — SOL IRR POUR NS 0.9% 1000ML